# Patient Record
Sex: MALE | Race: WHITE | NOT HISPANIC OR LATINO
[De-identification: names, ages, dates, MRNs, and addresses within clinical notes are randomized per-mention and may not be internally consistent; named-entity substitution may affect disease eponyms.]

---

## 2020-01-02 ENCOUNTER — APPOINTMENT (OUTPATIENT)
Dept: ORTHOPEDIC SURGERY | Facility: CLINIC | Age: 70
End: 2020-01-02
Payer: MEDICARE

## 2020-01-02 VITALS
HEART RATE: 71 BPM | HEIGHT: 70 IN | SYSTOLIC BLOOD PRESSURE: 133 MMHG | WEIGHT: 180 LBS | BODY MASS INDEX: 25.77 KG/M2 | DIASTOLIC BLOOD PRESSURE: 88 MMHG

## 2020-01-02 DIAGNOSIS — Z72.89 OTHER PROBLEMS RELATED TO LIFESTYLE: ICD-10-CM

## 2020-01-02 DIAGNOSIS — S99.921A UNSPECIFIED INJURY OF RIGHT FOOT, INITIAL ENCOUNTER: ICD-10-CM

## 2020-01-02 DIAGNOSIS — M20.21 HALLUX RIGIDUS, RIGHT FOOT: ICD-10-CM

## 2020-01-02 DIAGNOSIS — Z80.0 FAMILY HISTORY OF MALIGNANT NEOPLASM OF DIGESTIVE ORGANS: ICD-10-CM

## 2020-01-02 DIAGNOSIS — Z86.39 PERSONAL HISTORY OF OTHER ENDOCRINE, NUTRITIONAL AND METABOLIC DISEASE: ICD-10-CM

## 2020-01-02 DIAGNOSIS — Z78.9 OTHER SPECIFIED HEALTH STATUS: ICD-10-CM

## 2020-01-02 DIAGNOSIS — M21.611 BUNION OF RIGHT FOOT: ICD-10-CM

## 2020-01-02 PROBLEM — Z00.00 ENCOUNTER FOR PREVENTIVE HEALTH EXAMINATION: Status: ACTIVE | Noted: 2020-01-02

## 2020-01-02 PROCEDURE — 73630 X-RAY EXAM OF FOOT: CPT | Mod: RT

## 2020-01-02 PROCEDURE — 99214 OFFICE O/P EST MOD 30 MIN: CPT

## 2020-01-02 NOTE — PHYSICAL EXAM
[de-identified] : General: Alert and oriented x3. In no acute distress. Pleasant in nature with a normal affect. No apparent respiratory distress.\par \par R Foot Exam\par Skin: Clean, dry, intact\par Inspection: No obvious malalignment, no masses, +swelling, +erythema to MTP joint, no effusion\par Pulses: 2+ DP/PT pulses\par ROM: FOOT Full ROM of digits, ANKLE 10 degrees of dorsiflexion, 40 degrees of plantarflexion, 10 degrees of subtalar motion.\par Painful ROM: None\par Tenderness: +pain at 1st MTP joint. No tenderness over the medial malleolus, No tenderness over the lateral malleolus, no CFL/ATFL/PTFL pain, no deltoid ligament pain. No heel pain. No Achilles tenderness. No 5th metatarsal pain. No pain to the LisFranc joint. No ttp over the posterior tibial tendon.\par Stability: Negative anterior/posterior drawer.\par Strength: 5/5 ADD/ABD/TA/GS/EHL/FHL/EDL\par Neuro: Sensation in tact to light touch throughout\par Additional tests: Negative Mortons test, negative tarsal tunnel tinels, negative single heel rise. \par \par R Big Toe Exam\par ROM Great toe: Sluggish but functional movement to big toe.  [de-identified] : 3V of the right foot were ordered obtained and reviewed by me today, 01/02/2020 , revealed: Hallux rigidus, no acute fracture.

## 2020-01-02 NOTE — CONSULT LETTER
[Please see my note below.] : Please see my note below. [Consult Letter:] : I had the pleasure of evaluating your patient, [unfilled]. [Consult Closing:] : Thank you very much for allowing me to participate in the care of this patient.  If you have any questions, please do not hesitate to contact me. [Sincerely,] : Sincerely, [FreeTextEntry3] : Stephen Crystal, DO\par Foot and Ankle Surgery\par

## 2020-01-02 NOTE — HISTORY OF PRESENT ILLNESS
[FreeTextEntry1] : 69 year old male presenting with right foot pain. The patient’s pain is noted to be a 5/10. The patient's pain began on 12/19/19 when he hit his big toe against the corner of the bed, and it was also stepped on by a dog. The patient describes their pain as intermittent, sharp, and shooting. The pain is made worse by walking. He is currently taking Advil, ibuprofen and utilizing ice. No other complaints at this time.

## 2020-01-02 NOTE — ADDENDUM
[FreeTextEntry1] : I, Isaac Bahena, acted solely as a scribe for Dr. Stephen Crystal on this date 01/02/2020 .\par All medical record entries made by the Scribe were at my, Dr. Stephen Crystal, direction and personally dictated by me on 01/02/2020 . I have reviewed the chart and agree that the record accurately reflects my personal performance of the history, physical exam, assessment and plan. I have also personally directed, reviewed, and agreed with the chart.

## 2020-01-02 NOTE — DISCUSSION/SUMMARY
[de-identified] : Today I had a lengthy discussion with the patient regarding their right foot pain.I have addressed all the patient's concerns surrounding the pathology of their condition. I recommend that the patient utilize a toe spacer. The patient was provided with a toe spacer in the office today. I recommend that the patient utilize ice, NSAIDS PRN, and heat. They can also elevate their right foot above the level of the heart. I recommend that the patient utilize a stiff shoe. The patient was provided with the stiff shoe in the office today. I would like to see the patient back in the office PRN to reassess their condition. The patient understood and verbally agreed to the treatment plan. All of their questions were answered and they were satisfied with the visit. The patient should call the office if they have any questions or experience worsening symptoms.

## 2020-06-10 VITALS — BODY MASS INDEX: 26.83 KG/M2 | WEIGHT: 187 LBS | TEMPERATURE: 97.9 F

## 2022-12-06 ENCOUNTER — APPOINTMENT (OUTPATIENT)
Dept: UROLOGY | Facility: CLINIC | Age: 72
End: 2022-12-06

## 2022-12-06 VITALS
WEIGHT: 182 LBS | SYSTOLIC BLOOD PRESSURE: 147 MMHG | OXYGEN SATURATION: 98 % | HEIGHT: 70 IN | HEART RATE: 82 BPM | DIASTOLIC BLOOD PRESSURE: 75 MMHG | BODY MASS INDEX: 26.05 KG/M2

## 2022-12-06 DIAGNOSIS — R97.20 ELEVATED PROSTATE, SPECIFIC ANTIGEN [PSA]: ICD-10-CM

## 2022-12-06 DIAGNOSIS — R79.89 OTHER SPECIFIED ABNORMAL FINDINGS OF BLOOD CHEMISTRY: ICD-10-CM

## 2022-12-06 PROCEDURE — 99204 OFFICE O/P NEW MOD 45 MIN: CPT

## 2022-12-06 RX ORDER — SULFAMETHOXAZOLE AND TRIMETHOPRIM 800; 160 MG/1; MG/1
800-160 TABLET ORAL TWICE DAILY
Qty: 20 | Refills: 0 | Status: ACTIVE | COMMUNITY
Start: 2022-12-06 | End: 1900-01-01

## 2022-12-06 NOTE — PHYSICAL EXAM
[General Appearance - Well Developed] : well developed [General Appearance - Well Nourished] : well nourished [Normal Appearance] : normal appearance [Well Groomed] : well groomed [General Appearance - In No Acute Distress] : no acute distress [Edema] : no peripheral edema [Respiration, Rhythm And Depth] : normal respiratory rhythm and effort [Exaggerated Use Of Accessory Muscles For Inspiration] : no accessory muscle use [Abdomen Soft] : soft [Abdomen Tenderness] : non-tender [Costovertebral Angle Tenderness] : no ~M costovertebral angle tenderness [Urethral Meatus] : meatus normal [Urinary Bladder Findings] : the bladder was normal on palpation [Scrotum] : the scrotum was normal [Testes Mass (___cm)] : there were no testicular masses [No Prostate Nodules] : no prostate nodules [Normal Station and Gait] : the gait and station were normal for the patient's age [] : no rash [No Focal Deficits] : no focal deficits [Oriented To Time, Place, And Person] : oriented to person, place, and time [Affect] : the affect was normal [Mood] : the mood was normal [Not Anxious] : not anxious [No Palpable Adenopathy] : no palpable adenopathy [FreeTextEntry1] : Normal male external genitalia. The prostate gland is small to moderate size and palpably benign.

## 2022-12-06 NOTE — REVIEW OF SYSTEMS
[Wake up at night to urinate  How many times?  ___] : wakes up to urinate [unfilled] times during the night [see HPI] : see HPI [Negative] : Cardiovascular [FreeTextEntry2] : High blood pressure

## 2022-12-06 NOTE — HISTORY OF PRESENT ILLNESS
[FreeTextEntry1] : 72M presents today with a CC of a PSA elevation to 4.2 and a creatinine elevation to 1.59. Pt feels that he is urinating well and only has minor  symptoms of note. He has no history of  or renal problems in the past. PMHx includes HTN, high cholesterol, hypothyroidism, and food retention. PSHx includes knee replacement and operation for tongue cancer. Medications include Levothyroxine 75mcg daily, Atenolol 22.5mg BID, Amlodipine 10mg daily, Pravastatin 10mg daily, HCTZ 12.5mg daily. Tobacco use is quit several years ago. ETOH use is very occasional. Pt worked as a  and is now retired.

## 2022-12-06 NOTE — END OF VISIT
[FreeTextEntry3] : He will take a course of Bactrim DS. He will repeat the PSA and a renal panel in 10 days. I ordered a US of the bladder and kidneys. He will follow up with a TRUSP with plans to follow.

## 2022-12-27 ENCOUNTER — APPOINTMENT (OUTPATIENT)
Dept: UROLOGY | Facility: CLINIC | Age: 72
End: 2022-12-27

## 2022-12-27 VITALS
SYSTOLIC BLOOD PRESSURE: 186 MMHG | DIASTOLIC BLOOD PRESSURE: 78 MMHG | WEIGHT: 180 LBS | HEIGHT: 70 IN | OXYGEN SATURATION: 96 % | HEART RATE: 63 BPM | BODY MASS INDEX: 25.77 KG/M2

## 2022-12-27 DIAGNOSIS — R33.9 RETENTION OF URINE, UNSPECIFIED: ICD-10-CM

## 2022-12-27 PROCEDURE — 99213 OFFICE O/P EST LOW 20 MIN: CPT | Mod: 25

## 2022-12-27 PROCEDURE — 76872 US TRANSRECTAL: CPT

## 2022-12-27 PROCEDURE — 51741 ELECTRO-UROFLOWMETRY FIRST: CPT

## 2022-12-27 PROCEDURE — 76857 US EXAM PELVIC LIMITED: CPT

## 2022-12-27 NOTE — END OF VISIT
[FreeTextEntry3] : He will start Tamsulosin and Bethanechol with a follow up urodynamic study in 1 month.

## 2022-12-27 NOTE — REVIEW OF SYSTEMS
[see HPI] : see HPI [Wake up at night to urinate  How many times?  ___] : wakes up to urinate [unfilled] times during the night [Negative] : Heme/Lymph [FreeTextEntry2] : High blood pressure

## 2022-12-27 NOTE — HISTORY OF PRESENT ILLNESS
[FreeTextEntry1] : 72M presents today with a CC of a mild creatinine elevation and a high urinary residual on a TRUSP. Pt has a creatinine of 1.39. His creatineine went down to 3.2 from 4.2 after a course of antibiotics.

## 2022-12-27 NOTE — PHYSICAL EXAM
[General Appearance - Well Developed] : well developed [General Appearance - Well Nourished] : well nourished [Normal Appearance] : normal appearance [Well Groomed] : well groomed [General Appearance - In No Acute Distress] : no acute distress [Abdomen Soft] : soft [Abdomen Tenderness] : non-tender [Costovertebral Angle Tenderness] : no ~M costovertebral angle tenderness [Urethral Meatus] : meatus normal [Urinary Bladder Findings] : the bladder was normal on palpation [Scrotum] : the scrotum was normal [Testes Mass (___cm)] : there were no testicular masses [No Prostate Nodules] : no prostate nodules [Edema] : no peripheral edema [] : no respiratory distress [Respiration, Rhythm And Depth] : normal respiratory rhythm and effort [Exaggerated Use Of Accessory Muscles For Inspiration] : no accessory muscle use [Oriented To Time, Place, And Person] : oriented to person, place, and time [Affect] : the affect was normal [Mood] : the mood was normal [Not Anxious] : not anxious [Normal Station and Gait] : the gait and station were normal for the patient's age [No Focal Deficits] : no focal deficits [No Palpable Adenopathy] : no palpable adenopathy [FreeTextEntry1] : Normal male external genitalia. The prostate gland is small to moderate size and palpably benign.

## 2022-12-28 RX ORDER — BETHANECHOL CHLORIDE 50 MG/1
50 TABLET ORAL
Qty: 60 | Refills: 2 | Status: ACTIVE | COMMUNITY
Start: 2022-12-28 | End: 1900-01-01

## 2022-12-28 RX ORDER — TAMSULOSIN HYDROCHLORIDE 0.4 MG/1
0.4 CAPSULE ORAL
Qty: 90 | Refills: 2 | Status: ACTIVE | COMMUNITY
Start: 2022-12-28 | End: 1900-01-01

## 2023-01-05 ENCOUNTER — APPOINTMENT (OUTPATIENT)
Dept: UROLOGY | Facility: CLINIC | Age: 73
End: 2023-01-05

## 2023-01-19 ENCOUNTER — APPOINTMENT (OUTPATIENT)
Dept: UROLOGY | Facility: CLINIC | Age: 73
End: 2023-01-19
Payer: MEDICARE

## 2023-01-19 VITALS
DIASTOLIC BLOOD PRESSURE: 62 MMHG | OXYGEN SATURATION: 95 % | HEIGHT: 70 IN | WEIGHT: 180 LBS | HEART RATE: 64 BPM | SYSTOLIC BLOOD PRESSURE: 129 MMHG | TEMPERATURE: 98 F | BODY MASS INDEX: 25.77 KG/M2 | RESPIRATION RATE: 14 BRPM

## 2023-01-19 DIAGNOSIS — N40.1 BENIGN PROSTATIC HYPERPLASIA WITH LOWER URINARY TRACT SYMPMS: ICD-10-CM

## 2023-01-19 DIAGNOSIS — N13.8 BENIGN PROSTATIC HYPERPLASIA WITH LOWER URINARY TRACT SYMPMS: ICD-10-CM

## 2023-01-19 PROCEDURE — 51798 US URINE CAPACITY MEASURE: CPT

## 2023-01-19 PROCEDURE — 51741 ELECTRO-UROFLOWMETRY FIRST: CPT

## 2023-01-19 PROCEDURE — 51797 INTRAABDOMINAL PRESSURE TEST: CPT

## 2023-01-19 PROCEDURE — 51784 ANAL/URINARY MUSCLE STUDY: CPT

## 2023-01-19 PROCEDURE — 51728 CYSTOMETROGRAM W/VP: CPT

## 2023-01-23 ENCOUNTER — APPOINTMENT (OUTPATIENT)
Dept: VASCULAR SURGERY | Facility: CLINIC | Age: 73
End: 2023-01-23
Payer: MEDICARE

## 2023-01-23 VITALS
BODY MASS INDEX: 25.77 KG/M2 | HEIGHT: 70 IN | SYSTOLIC BLOOD PRESSURE: 112 MMHG | HEART RATE: 58 BPM | DIASTOLIC BLOOD PRESSURE: 64 MMHG | WEIGHT: 180 LBS

## 2023-01-23 PROCEDURE — 93880 EXTRACRANIAL BILAT STUDY: CPT

## 2023-01-23 PROCEDURE — 99212 OFFICE O/P EST SF 10 MIN: CPT

## 2023-01-23 NOTE — ASSESSMENT
[FreeTextEntry1] : 72-year-old with asymptomatic moderate carotid artery disease bilaterally.  I reviewed the results of the carotid duplex study with the patient and suggested continued surveillance approximately 1 year.\par \par All questions were answered.

## 2023-01-23 NOTE — PHYSICAL EXAM
[Alert] : alert [Oriented to Person] : oriented to person [Oriented to Place] : oriented to place [Oriented to Time] : oriented to time

## 2023-01-23 NOTE — HISTORY OF PRESENT ILLNESS
[FreeTextEntry1] : Patient presents for routine reevaluation.  He had undergone a carotid duplex study in this office on January 23 (today) that demonstrated essentially stable carotid disease bilaterally.  His right side had flow velocities in the 50 to 69% narrowing range and is left in the less than 50% range.\par \par He denies neurologic event since his last office visit.  He remains on 1 baby aspirin daily.

## 2023-01-26 ENCOUNTER — APPOINTMENT (OUTPATIENT)
Dept: UROLOGY | Facility: CLINIC | Age: 73
End: 2023-01-26

## 2023-01-27 PROBLEM — N40.1 BPH WITH URINARY OBSTRUCTION: Status: ACTIVE | Noted: 2022-12-27

## 2023-05-10 ENCOUNTER — NON-APPOINTMENT (OUTPATIENT)
Age: 73
End: 2023-05-10

## 2023-05-10 DIAGNOSIS — E78.5 HYPERLIPIDEMIA, UNSPECIFIED: ICD-10-CM

## 2023-05-10 DIAGNOSIS — Z87.891 PERSONAL HISTORY OF NICOTINE DEPENDENCE: ICD-10-CM

## 2023-05-10 DIAGNOSIS — E03.9 HYPOTHYROIDISM, UNSPECIFIED: ICD-10-CM

## 2023-05-10 RX ORDER — AMLODIPINE BESYLATE 5 MG/1
5 TABLET ORAL DAILY
Refills: 0 | Status: ACTIVE | COMMUNITY

## 2023-05-10 RX ORDER — ATENOLOL 25 MG/1
25 TABLET ORAL TWICE DAILY
Refills: 0 | Status: ACTIVE | COMMUNITY

## 2023-05-10 RX ORDER — LEVOTHYROXINE SODIUM 0.07 MG/1
75 TABLET ORAL DAILY
Refills: 0 | Status: ACTIVE | COMMUNITY

## 2023-05-10 RX ORDER — HYDRALAZINE HYDROCHLORIDE 50 MG/1
50 TABLET ORAL DAILY
Refills: 0 | Status: ACTIVE | COMMUNITY

## 2023-05-10 RX ORDER — PRAVASTATIN SODIUM 10 MG/1
10 TABLET ORAL DAILY
Refills: 0 | Status: ACTIVE | COMMUNITY

## 2023-08-24 ENCOUNTER — NON-APPOINTMENT (OUTPATIENT)
Age: 73
End: 2023-08-24

## 2023-10-23 ENCOUNTER — APPOINTMENT (OUTPATIENT)
Dept: VASCULAR SURGERY | Facility: CLINIC | Age: 73
End: 2023-10-23

## 2023-10-23 ENCOUNTER — APPOINTMENT (OUTPATIENT)
Dept: VASCULAR SURGERY | Facility: CLINIC | Age: 73
End: 2023-10-23
Payer: MEDICARE

## 2023-10-23 VITALS — OXYGEN SATURATION: 95 % | HEIGHT: 70 IN | WEIGHT: 178 LBS | HEART RATE: 60 BPM | BODY MASS INDEX: 25.48 KG/M2

## 2023-10-23 DIAGNOSIS — I77.9 DISORDER OF ARTERIES AND ARTERIOLES, UNSPECIFIED: ICD-10-CM

## 2023-10-23 DIAGNOSIS — I77.71 DISSECTION OF CAROTID ARTERY: ICD-10-CM

## 2023-10-23 DIAGNOSIS — I10 ESSENTIAL (PRIMARY) HYPERTENSION: ICD-10-CM

## 2023-10-23 PROCEDURE — 99214 OFFICE O/P EST MOD 30 MIN: CPT

## 2023-10-23 PROCEDURE — 93880 EXTRACRANIAL BILAT STUDY: CPT

## 2023-10-24 PROBLEM — I77.9 CAROTID ARTERY DISEASE: Status: ACTIVE | Noted: 2023-01-23

## 2023-10-24 PROBLEM — I10 HYPERTENSION: Status: ACTIVE | Noted: 2023-05-10

## 2023-10-26 ENCOUNTER — APPOINTMENT (OUTPATIENT)
Dept: CT IMAGING | Facility: CLINIC | Age: 73
End: 2023-10-26
Payer: MEDICARE

## 2023-10-26 ENCOUNTER — OUTPATIENT (OUTPATIENT)
Dept: OUTPATIENT SERVICES | Facility: HOSPITAL | Age: 73
LOS: 1 days | End: 2023-10-26
Payer: MEDICARE

## 2023-10-26 DIAGNOSIS — I77.71 DISSECTION OF CAROTID ARTERY: ICD-10-CM

## 2023-10-26 PROCEDURE — 70496 CT ANGIOGRAPHY HEAD: CPT

## 2023-10-26 PROCEDURE — 70496 CT ANGIOGRAPHY HEAD: CPT | Mod: 26,MH

## 2023-10-26 PROCEDURE — 70498 CT ANGIOGRAPHY NECK: CPT

## 2023-10-26 PROCEDURE — 70498 CT ANGIOGRAPHY NECK: CPT | Mod: 26,MH

## 2024-05-01 ENCOUNTER — APPOINTMENT (OUTPATIENT)
Dept: VASCULAR SURGERY | Facility: CLINIC | Age: 74
End: 2024-05-01

## 2024-05-01 ENCOUNTER — APPOINTMENT (OUTPATIENT)
Dept: VASCULAR SURGERY | Facility: CLINIC | Age: 74
End: 2024-05-01
Payer: MEDICARE

## 2024-05-01 PROCEDURE — 99213 OFFICE O/P EST LOW 20 MIN: CPT

## 2024-05-01 PROCEDURE — 93880 EXTRACRANIAL BILAT STUDY: CPT

## 2024-05-01 RX ORDER — LORAZEPAM 2 MG/1
TABLET ORAL
Refills: 0 | Status: ACTIVE | COMMUNITY

## 2024-05-30 ENCOUNTER — APPOINTMENT (OUTPATIENT)
Dept: GASTROENTEROLOGY | Facility: CLINIC | Age: 74
End: 2024-05-30
Payer: MEDICARE

## 2024-05-30 VITALS
BODY MASS INDEX: 24.62 KG/M2 | WEIGHT: 172 LBS | HEIGHT: 70 IN | DIASTOLIC BLOOD PRESSURE: 98 MMHG | SYSTOLIC BLOOD PRESSURE: 162 MMHG

## 2024-05-30 DIAGNOSIS — K21.9 GASTRO-ESOPHAGEAL REFLUX DISEASE W/OUT ESOPHAGITIS: ICD-10-CM

## 2024-05-30 DIAGNOSIS — R13.19 OTHER DYSPHAGIA: ICD-10-CM

## 2024-05-30 DIAGNOSIS — Z85.810 PERSONAL HISTORY OF MALIGNANT NEOPLASM OF TONGUE: ICD-10-CM

## 2024-05-30 PROCEDURE — 99205 OFFICE O/P NEW HI 60 MIN: CPT

## 2024-05-30 NOTE — ASSESSMENT
[FreeTextEntry1] : 73yo male with dysphagia, gerd  he will need egd to evaluate dysphagia have concerns about ability to tolerate anesthesia and potential ease of intubation  I spoke with dr hartman, anesthesia, dr resendez referring doctor  we oliver review ent records and he may need to see to comment on airway and intubation will check egd

## 2024-05-30 NOTE — HISTORY OF PRESENT ILLNESS
[FreeTextEntry1] : 73yo male with dysphagia  He has hx tongue cancer s/p xrt, chemo about 20 years ago  he has residual dysphonia Also with increasing dysphagia  He has hx aspiration after knee surgery He had recent negative ENT evaluation but more for sinus issues  He had recent colonoscopy and endoscopy canceled after anesthesia concerned

## 2024-07-10 ENCOUNTER — APPOINTMENT (OUTPATIENT)
Dept: ORTHOPEDIC SURGERY | Facility: CLINIC | Age: 74
End: 2024-07-10

## 2024-08-19 ENCOUNTER — APPOINTMENT (OUTPATIENT)
Dept: VASCULAR SURGERY | Facility: CLINIC | Age: 74
End: 2024-08-19

## 2024-08-19 VITALS
BODY MASS INDEX: 21.76 KG/M2 | SYSTOLIC BLOOD PRESSURE: 168 MMHG | HEART RATE: 84 BPM | OXYGEN SATURATION: 96 % | WEIGHT: 152 LBS | HEIGHT: 70 IN | DIASTOLIC BLOOD PRESSURE: 73 MMHG

## 2024-08-19 DIAGNOSIS — Z86.73 PERSONAL HISTORY OF TRANSIENT ISCHEMIC ATTACK (TIA), AND CEREBRAL INFARCTION W/OUT RESIDUAL DEFICITS: ICD-10-CM

## 2024-08-19 PROCEDURE — 99214 OFFICE O/P EST MOD 30 MIN: CPT

## 2024-08-19 PROCEDURE — 93880 EXTRACRANIAL BILAT STUDY: CPT

## 2024-09-23 ENCOUNTER — APPOINTMENT (OUTPATIENT)
Dept: VASCULAR SURGERY | Facility: CLINIC | Age: 74
End: 2024-09-23

## 2024-11-25 ENCOUNTER — APPOINTMENT (OUTPATIENT)
Dept: VASCULAR SURGERY | Facility: CLINIC | Age: 74
End: 2024-11-25
Payer: MEDICARE

## 2024-11-25 VITALS
SYSTOLIC BLOOD PRESSURE: 178 MMHG | BODY MASS INDEX: 21.76 KG/M2 | HEART RATE: 70 BPM | HEIGHT: 70 IN | WEIGHT: 152 LBS | OXYGEN SATURATION: 98 % | DIASTOLIC BLOOD PRESSURE: 80 MMHG

## 2024-11-25 DIAGNOSIS — I77.9 DISORDER OF ARTERIES AND ARTERIOLES, UNSPECIFIED: ICD-10-CM

## 2024-11-25 DIAGNOSIS — I77.71 DISSECTION OF CAROTID ARTERY: ICD-10-CM

## 2024-11-25 PROCEDURE — 99213 OFFICE O/P EST LOW 20 MIN: CPT

## 2024-11-25 PROCEDURE — 93880 EXTRACRANIAL BILAT STUDY: CPT

## 2024-12-15 PROBLEM — I63.521 CEREBROVASCULAR ACCIDENT (CVA) DUE TO OCCLUSION OF RIGHT ANTERIOR CEREBRAL ARTERY: Status: ACTIVE | Noted: 2024-12-15

## 2024-12-18 ENCOUNTER — APPOINTMENT (OUTPATIENT)
Dept: VASCULAR SURGERY | Facility: CLINIC | Age: 74
End: 2024-12-18
Payer: MEDICARE

## 2024-12-18 VITALS
OXYGEN SATURATION: 96 % | DIASTOLIC BLOOD PRESSURE: 98 MMHG | SYSTOLIC BLOOD PRESSURE: 177 MMHG | BODY MASS INDEX: 22.33 KG/M2 | WEIGHT: 156 LBS | HEIGHT: 70 IN | HEART RATE: 79 BPM

## 2024-12-18 DIAGNOSIS — I63.521 CEREBRAL INFARCTION DUE TO UNSPECIFIED OCCLUSION OR STENOSIS OF RIGHT ANTERIOR CEREBRAL ARTERY: ICD-10-CM

## 2024-12-18 DIAGNOSIS — I77.9 DISORDER OF ARTERIES AND ARTERIOLES, UNSPECIFIED: ICD-10-CM

## 2024-12-18 DIAGNOSIS — I77.71 DISSECTION OF CAROTID ARTERY: ICD-10-CM

## 2024-12-18 PROCEDURE — 99213 OFFICE O/P EST LOW 20 MIN: CPT

## 2024-12-24 ENCOUNTER — APPOINTMENT (OUTPATIENT)
Dept: NEUROLOGY | Facility: CLINIC | Age: 74
End: 2024-12-24

## 2024-12-25 PROBLEM — F10.90 ALCOHOL USE: Status: ACTIVE | Noted: 2020-01-02

## 2025-04-28 ENCOUNTER — INPATIENT (INPATIENT)
Facility: HOSPITAL | Age: 75
LOS: 2 days | Discharge: HOME | DRG: 378 | End: 2025-05-01
Attending: STUDENT IN AN ORGANIZED HEALTH CARE EDUCATION/TRAINING PROGRAM | Admitting: STUDENT IN AN ORGANIZED HEALTH CARE EDUCATION/TRAINING PROGRAM
Payer: MEDICARE

## 2025-04-28 VITALS
OXYGEN SATURATION: 100 % | HEART RATE: 87 BPM | WEIGHT: 160.94 LBS | RESPIRATION RATE: 18 BRPM | SYSTOLIC BLOOD PRESSURE: 139 MMHG | TEMPERATURE: 98 F | DIASTOLIC BLOOD PRESSURE: 68 MMHG

## 2025-04-28 DIAGNOSIS — K92.2 GASTROINTESTINAL HEMORRHAGE, UNSPECIFIED: ICD-10-CM

## 2025-04-28 LAB
ALBUMIN SERPL ELPH-MCNC: 3.5 G/DL — SIGNIFICANT CHANGE UP (ref 3.3–5)
ALP SERPL-CCNC: 94 U/L — SIGNIFICANT CHANGE UP (ref 40–120)
ALT FLD-CCNC: 19 U/L — SIGNIFICANT CHANGE UP (ref 12–78)
ANION GAP SERPL CALC-SCNC: 6 MMOL/L — SIGNIFICANT CHANGE UP (ref 5–17)
APTT BLD: 31.7 SEC — SIGNIFICANT CHANGE UP (ref 26.1–36.8)
AST SERPL-CCNC: 18 U/L — SIGNIFICANT CHANGE UP (ref 15–37)
BASOPHILS # BLD AUTO: 0.08 K/UL — SIGNIFICANT CHANGE UP (ref 0–0.2)
BASOPHILS NFR BLD AUTO: 1.3 % — SIGNIFICANT CHANGE UP (ref 0–2)
BILIRUB SERPL-MCNC: 0.7 MG/DL — SIGNIFICANT CHANGE UP (ref 0.2–1.2)
BLD GP AB SCN SERPL QL: SIGNIFICANT CHANGE UP
BUN SERPL-MCNC: 29 MG/DL — HIGH (ref 7–23)
CALCIUM SERPL-MCNC: 9.4 MG/DL — SIGNIFICANT CHANGE UP (ref 8.5–10.1)
CHLORIDE SERPL-SCNC: 105 MMOL/L — SIGNIFICANT CHANGE UP (ref 96–108)
CO2 SERPL-SCNC: 28 MMOL/L — SIGNIFICANT CHANGE UP (ref 22–31)
CREAT SERPL-MCNC: 1.55 MG/DL — HIGH (ref 0.5–1.3)
EGFR: 46 ML/MIN/1.73M2 — LOW
EGFR: 46 ML/MIN/1.73M2 — LOW
EOSINOPHIL # BLD AUTO: 0.23 K/UL — SIGNIFICANT CHANGE UP (ref 0–0.5)
EOSINOPHIL NFR BLD AUTO: 3.7 % — SIGNIFICANT CHANGE UP (ref 0–6)
GLUCOSE SERPL-MCNC: 106 MG/DL — HIGH (ref 70–99)
HCT VFR BLD CALC: 32.3 % — LOW (ref 39–50)
HGB BLD-MCNC: 11 G/DL — LOW (ref 13–17)
IMM GRANULOCYTES # BLD AUTO: 0.01 K/UL — SIGNIFICANT CHANGE UP (ref 0–0.07)
IMM GRANULOCYTES NFR BLD AUTO: 0.2 % — SIGNIFICANT CHANGE UP (ref 0–0.9)
INR BLD: 0.97 RATIO — SIGNIFICANT CHANGE UP (ref 0.85–1.16)
LYMPHOCYTES # BLD AUTO: 1.07 K/UL — SIGNIFICANT CHANGE UP (ref 1–3.3)
LYMPHOCYTES NFR BLD AUTO: 17.3 % — SIGNIFICANT CHANGE UP (ref 13–44)
MCHC RBC-ENTMCNC: 30.7 PG — SIGNIFICANT CHANGE UP (ref 27–34)
MCHC RBC-ENTMCNC: 34.1 G/DL — SIGNIFICANT CHANGE UP (ref 32–36)
MCV RBC AUTO: 90.2 FL — SIGNIFICANT CHANGE UP (ref 80–100)
MONOCYTES # BLD AUTO: 0.36 K/UL — SIGNIFICANT CHANGE UP (ref 0–0.9)
MONOCYTES NFR BLD AUTO: 5.8 % — SIGNIFICANT CHANGE UP (ref 2–14)
NEUTROPHILS # BLD AUTO: 4.43 K/UL — SIGNIFICANT CHANGE UP (ref 1.8–7.4)
NEUTROPHILS NFR BLD AUTO: 71.7 % — SIGNIFICANT CHANGE UP (ref 43–77)
NRBC # BLD AUTO: 0 K/UL — SIGNIFICANT CHANGE UP (ref 0–0)
NRBC # FLD: 0 K/UL — SIGNIFICANT CHANGE UP (ref 0–0)
NRBC BLD AUTO-RTO: 0 /100 WBCS — SIGNIFICANT CHANGE UP (ref 0–0)
PLATELET # BLD AUTO: 212 K/UL — SIGNIFICANT CHANGE UP (ref 150–400)
PMV BLD: 10.9 FL — SIGNIFICANT CHANGE UP (ref 7–13)
POTASSIUM SERPL-MCNC: 4.4 MMOL/L — SIGNIFICANT CHANGE UP (ref 3.5–5.3)
POTASSIUM SERPL-SCNC: 4.4 MMOL/L — SIGNIFICANT CHANGE UP (ref 3.5–5.3)
PROT SERPL-MCNC: 6.9 GM/DL — SIGNIFICANT CHANGE UP (ref 6–8.3)
PROTHROM AB SERPL-ACNC: 11.2 SEC — SIGNIFICANT CHANGE UP (ref 9.9–13.4)
RBC # BLD: 3.58 M/UL — LOW (ref 4.2–5.8)
RBC # FLD: 12.7 % — SIGNIFICANT CHANGE UP (ref 10.3–14.5)
SODIUM SERPL-SCNC: 139 MMOL/L — SIGNIFICANT CHANGE UP (ref 135–145)
WBC # BLD: 6.18 K/UL — SIGNIFICANT CHANGE UP (ref 3.8–10.5)
WBC # FLD AUTO: 6.18 K/UL — SIGNIFICANT CHANGE UP (ref 3.8–10.5)

## 2025-04-28 PROCEDURE — 80048 BASIC METABOLIC PNL TOTAL CA: CPT

## 2025-04-28 PROCEDURE — 93010 ELECTROCARDIOGRAM REPORT: CPT

## 2025-04-28 PROCEDURE — 83550 IRON BINDING TEST: CPT

## 2025-04-28 PROCEDURE — 85027 COMPLETE CBC AUTOMATED: CPT

## 2025-04-28 PROCEDURE — 85014 HEMATOCRIT: CPT

## 2025-04-28 PROCEDURE — 82728 ASSAY OF FERRITIN: CPT

## 2025-04-28 PROCEDURE — 99285 EMERGENCY DEPT VISIT HI MDM: CPT

## 2025-04-28 PROCEDURE — 74178 CT ABD&PLV WO CNTR FLWD CNTR: CPT | Mod: 26

## 2025-04-28 PROCEDURE — 85018 HEMOGLOBIN: CPT

## 2025-04-28 PROCEDURE — 36415 COLL VENOUS BLD VENIPUNCTURE: CPT

## 2025-04-28 PROCEDURE — 83540 ASSAY OF IRON: CPT

## 2025-04-28 RX ORDER — LEVOTHYROXINE SODIUM 300 MCG
1 TABLET ORAL
Refills: 0 | DISCHARGE

## 2025-04-28 RX ORDER — CLOPIDOGREL BISULFATE 75 MG/1
1 TABLET, FILM COATED ORAL
Refills: 0 | DISCHARGE

## 2025-04-28 RX ORDER — ASPIRIN 325 MG
1 TABLET ORAL
Refills: 0 | DISCHARGE

## 2025-04-28 RX ORDER — AMLODIPINE BESYLATE 10 MG/1
1 TABLET ORAL
Refills: 0 | DISCHARGE

## 2025-04-28 RX ORDER — ATORVASTATIN CALCIUM 80 MG/1
1 TABLET, FILM COATED ORAL
Refills: 0 | DISCHARGE

## 2025-04-28 NOTE — ED PROVIDER NOTE - NSICDXPASTMEDICALHX_GEN_ALL_CORE_FT
PAST MEDICAL HISTORY:  CVA (cerebrovascular accident)     HLD (hyperlipidemia)     HTN (hypertension)     Tongue cancer

## 2025-04-28 NOTE — ED PROVIDER NOTE - OBJECTIVE STATEMENT
74 y/o male PMHX HTN, HLD, CVA on asprin and Plavix, tongue cancer in remission, sent to ED by colorectal Dr. Dewitt for bloody stool for past 3 days. Pt went to see his colorectal surgeon Dr. Dewitt who did rectal and found occult blood and sent to ED for admission. Pt denies abdominal pain. No history of abdominal surgeries or gastrointestinal bleeding. Pt's last colonoscopy was 5 years ago and was negative, per patient.

## 2025-04-28 NOTE — ED ADULT NURSE REASSESSMENT NOTE - NS ED NURSE REASSESS COMMENT FT1
Pt aox3, denies weakness or fatigue.  Resp even, unlabored. Abd soft, NT/ND. Pt reports no bloody BM since arrival. Ambulates OOB to BR with minimal assist. Pt tolerating po fluids, apple sauce. VSS. Repeat H/H @ 0000. Report given to 1N. Pt in no acute distress at this time.

## 2025-04-28 NOTE — ED STATDOCS - PROGRESS NOTE DETAILS
Corey Lyons  76 y/o M with PMHx of HTN, HLD, tongue CA, stroke on Plavix and baby ASA presents to the ED c/o rectal bleeding x4 days. States he has been having BRBPR after going out to eat Friday night. States the bleeding has not stopped. Pt was dizzy after passing a bm this morning but that has since subsided. Reports seeing his Colorectal Surgeon Dr Dewitt, had a rectal exam (which only showed ada blood) and was told to come to the ED for evaluation. Last colonoscopy was last year and was normal. Will send to main.

## 2025-04-28 NOTE — PHARMACOTHERAPY INTERVENTION NOTE - COMMENTS
Medication reconciliation completed.  Reviewed Medication list and confirmed med allergies with patient; confirmed with Dr. First Medjulio c.

## 2025-04-28 NOTE — ED PROVIDER NOTE - PHYSICAL EXAMINATION
General:     NAD, well-nourished, well-appearing  Head:     NC/AT, EOMI, oral mucosa moist  Neck:     trachea midline  Lungs:     CTA b/l, no w/r/r  CVS:     S1S2, RRR, no m/g/r  Abd:     +BS, s/nt/nd, no organomegaly  Rectal:  rectal deferred given positive finding today  Ext:    2+ radial and pedal pulses, no c/c/e  Neuro: AAOx3, no sensory/motor deficits

## 2025-04-28 NOTE — ED ADULT TRIAGE NOTE - CHIEF COMPLAINT QUOTE
pt ambulatory to triage c/o bright red bloody stools since friday. -allergies. pmh HTN, HLD, (on plavix and baby asa), tongue CA, stroke

## 2025-04-28 NOTE — ED ADULT NURSE NOTE - NSFALLHARMRISKINTERV_ED_ALL_ED

## 2025-04-28 NOTE — ED PROVIDER NOTE - CLINICAL SUMMARY MEDICAL DECISION MAKING FREE TEXT BOX
DDx: GI bleed, rule out anemia  plan: CBC, CMP.  Type and screen, CT with/without contrast. GI consult, likely admission.

## 2025-04-28 NOTE — ED ADULT NURSE NOTE - OBJECTIVE STATEMENT
Detail Level: Detailed Was A Bandage Applied: Yes Punch Size In Mm: 3 Biopsy Type: H and E Anesthesia Type: 1% lidocaine with epinephrine Anesthesia Volume In Cc (Will Not Render If 0): 0.5 Additional Anesthesia Volume In Cc (Will Not Render If 0): 0 Hemostasis: Sondra's Epidermal Sutures: 4-0 Nylon Number Of Epidermal Sutures (Optional): 1 Wound Care: No ointment Dressing: bandage Patient Will Remove Sutures At Home?: No Lab: [id number] Lab Facility: [id number] Consent: Written consent was obtained and risks were reviewed including but not limited to scarring, infection, bleeding, scabbing, incomplete removal, nerve damage and allergy to anesthesia. Post-Care Instructions: I reviewed with the patient in detail post-care instructions. Patient is to keep the biopsy site dry overnight, and then apply bacitracin twice daily until healed. Patient may apply hydrogen peroxide soaks to remove any crusting. Home Suture Removal Text: Patient was provided a home suture removal kit and will remove their sutures at home.  If they have any questions or difficulties they will call the office. Notification Instructions: Patient will be notified of biopsy results. However, patient instructed to call the office if not contacted within 2 weeks. Additional Anticipated Plans: If malignant consider curettage and destruction Billing Type: Third-Party Bill Information: Selecting Yes will display possible errors in your note based on the variables you have selected. This validation is only offered as a suggestion for you. PLEASE NOTE THAT THE VALIDATION TEXT WILL BE REMOVED WHEN YOU FINALIZE YOUR NOTE. IF YOU WANT TO FAX A PRELIMINARY NOTE YOU WILL NEED TO TOGGLE THIS TO 'NO' IF YOU DO NOT WANT IT IN YOUR FAXED NOTE. 75y male presented to the ED with complaints of rectal bleeding since Friday. Pt states he has had 4 episodes of bright red mixed stool for the past 4 days. Pt on ASA and Plavix. Pt had stroke last year with right sided weakness.

## 2025-04-29 DIAGNOSIS — Z96.653 PRESENCE OF ARTIFICIAL KNEE JOINT, BILATERAL: Chronic | ICD-10-CM

## 2025-04-29 DIAGNOSIS — S42.009A FRACTURE OF UNSPECIFIED PART OF UNSPECIFIED CLAVICLE, INITIAL ENCOUNTER FOR CLOSED FRACTURE: Chronic | ICD-10-CM

## 2025-04-29 DIAGNOSIS — Z98.890 OTHER SPECIFIED POSTPROCEDURAL STATES: Chronic | ICD-10-CM

## 2025-04-29 LAB
ANION GAP SERPL CALC-SCNC: 2 MMOL/L — LOW (ref 5–17)
BUN SERPL-MCNC: 22 MG/DL — SIGNIFICANT CHANGE UP (ref 7–23)
CALCIUM SERPL-MCNC: 9.2 MG/DL — SIGNIFICANT CHANGE UP (ref 8.5–10.1)
CHLORIDE SERPL-SCNC: 107 MMOL/L — SIGNIFICANT CHANGE UP (ref 96–108)
CO2 SERPL-SCNC: 31 MMOL/L — SIGNIFICANT CHANGE UP (ref 22–31)
CREAT SERPL-MCNC: 1.2 MG/DL — SIGNIFICANT CHANGE UP (ref 0.5–1.3)
EGFR: 62 ML/MIN/1.73M2 — SIGNIFICANT CHANGE UP
EGFR: 62 ML/MIN/1.73M2 — SIGNIFICANT CHANGE UP
GLUCOSE SERPL-MCNC: 87 MG/DL — SIGNIFICANT CHANGE UP (ref 70–99)
HCT VFR BLD CALC: 26.3 % — LOW (ref 39–50)
HCT VFR BLD CALC: 26.9 % — LOW (ref 39–50)
HCT VFR BLD CALC: 26.9 % — LOW (ref 39–50)
HGB BLD-MCNC: 8.7 G/DL — LOW (ref 13–17)
HGB BLD-MCNC: 9 G/DL — LOW (ref 13–17)
HGB BLD-MCNC: 9.1 G/DL — LOW (ref 13–17)
MCHC RBC-ENTMCNC: 30.4 PG — SIGNIFICANT CHANGE UP (ref 27–34)
MCHC RBC-ENTMCNC: 30.7 PG — SIGNIFICANT CHANGE UP (ref 27–34)
MCHC RBC-ENTMCNC: 33.5 G/DL — SIGNIFICANT CHANGE UP (ref 32–36)
MCHC RBC-ENTMCNC: 33.8 G/DL — SIGNIFICANT CHANGE UP (ref 32–36)
MCV RBC AUTO: 90.9 FL — SIGNIFICANT CHANGE UP (ref 80–100)
MCV RBC AUTO: 90.9 FL — SIGNIFICANT CHANGE UP (ref 80–100)
NRBC # BLD AUTO: 0 K/UL — SIGNIFICANT CHANGE UP (ref 0–0)
NRBC # BLD AUTO: 0 K/UL — SIGNIFICANT CHANGE UP (ref 0–0)
NRBC # FLD: 0 K/UL — SIGNIFICANT CHANGE UP (ref 0–0)
NRBC # FLD: 0 K/UL — SIGNIFICANT CHANGE UP (ref 0–0)
NRBC BLD AUTO-RTO: 0 /100 WBCS — SIGNIFICANT CHANGE UP (ref 0–0)
NRBC BLD AUTO-RTO: 0 /100 WBCS — SIGNIFICANT CHANGE UP (ref 0–0)
PLATELET # BLD AUTO: 177 K/UL — SIGNIFICANT CHANGE UP (ref 150–400)
PLATELET # BLD AUTO: 192 K/UL — SIGNIFICANT CHANGE UP (ref 150–400)
PMV BLD: 10.7 FL — SIGNIFICANT CHANGE UP (ref 7–13)
PMV BLD: 10.9 FL — SIGNIFICANT CHANGE UP (ref 7–13)
POTASSIUM SERPL-MCNC: 3.8 MMOL/L — SIGNIFICANT CHANGE UP (ref 3.5–5.3)
POTASSIUM SERPL-SCNC: 3.8 MMOL/L — SIGNIFICANT CHANGE UP (ref 3.5–5.3)
RBC # BLD: 2.96 M/UL — LOW (ref 4.2–5.8)
RBC # BLD: 2.96 M/UL — LOW (ref 4.2–5.8)
RBC # FLD: 12.9 % — SIGNIFICANT CHANGE UP (ref 10.3–14.5)
RBC # FLD: 13 % — SIGNIFICANT CHANGE UP (ref 10.3–14.5)
SODIUM SERPL-SCNC: 140 MMOL/L — SIGNIFICANT CHANGE UP (ref 135–145)
WBC # BLD: 5.24 K/UL — SIGNIFICANT CHANGE UP (ref 3.8–10.5)
WBC # BLD: 5.39 K/UL — SIGNIFICANT CHANGE UP (ref 3.8–10.5)
WBC # FLD AUTO: 5.24 K/UL — SIGNIFICANT CHANGE UP (ref 3.8–10.5)
WBC # FLD AUTO: 5.39 K/UL — SIGNIFICANT CHANGE UP (ref 3.8–10.5)

## 2025-04-29 PROCEDURE — 99222 1ST HOSP IP/OBS MODERATE 55: CPT

## 2025-04-29 RX ORDER — ATORVASTATIN CALCIUM 80 MG/1
40 TABLET, FILM COATED ORAL AT BEDTIME
Refills: 0 | Status: DISCONTINUED | OUTPATIENT
Start: 2025-04-29 | End: 2025-05-01

## 2025-04-29 RX ORDER — ACETAMINOPHEN 500 MG/5ML
650 LIQUID (ML) ORAL EVERY 6 HOURS
Refills: 0 | Status: DISCONTINUED | OUTPATIENT
Start: 2025-04-29 | End: 2025-05-01

## 2025-04-29 RX ORDER — CLOPIDOGREL BISULFATE 75 MG/1
75 TABLET, FILM COATED ORAL DAILY
Refills: 0 | Status: DISCONTINUED | OUTPATIENT
Start: 2025-04-29 | End: 2025-05-01

## 2025-04-29 RX ORDER — MAGNESIUM, ALUMINUM HYDROXIDE 200-200 MG
30 TABLET,CHEWABLE ORAL EVERY 4 HOURS
Refills: 0 | Status: DISCONTINUED | OUTPATIENT
Start: 2025-04-29 | End: 2025-05-01

## 2025-04-29 RX ORDER — ONDANSETRON HCL/PF 4 MG/2 ML
4 VIAL (ML) INJECTION EVERY 8 HOURS
Refills: 0 | Status: DISCONTINUED | OUTPATIENT
Start: 2025-04-29 | End: 2025-05-01

## 2025-04-29 RX ORDER — MELATONIN 5 MG
3 TABLET ORAL AT BEDTIME
Refills: 0 | Status: DISCONTINUED | OUTPATIENT
Start: 2025-04-29 | End: 2025-05-01

## 2025-04-29 RX ORDER — ASPIRIN 325 MG
81 TABLET ORAL DAILY
Refills: 0 | Status: DISCONTINUED | OUTPATIENT
Start: 2025-04-29 | End: 2025-05-01

## 2025-04-29 RX ORDER — INFLUENZA A VIRUS A/IDAHO/07/2018 (H1N1) ANTIGEN (MDCK CELL DERIVED, PROPIOLACTONE INACTIVATED, INFLUENZA A VIRUS A/INDIANA/08/2018 (H3N2) ANTIGEN (MDCK CELL DERIVED, PROPIOLACTONE INACTIVATED), INFLUENZA B VIRUS B/SINGAPORE/INFTT-16-0610/2016 ANTIGEN (MDCK CELL DERIVED, PROPIOLACTONE INACTIVATED), INFLUENZA B VIRUS B/IOWA/06/2017 ANTIGEN (MDCK CELL DERIVED, PROPIOLACTONE INACTIVATED) 15; 15; 15; 15 UG/.5ML; UG/.5ML; UG/.5ML; UG/.5ML
0.5 INJECTION, SUSPENSION INTRAMUSCULAR ONCE
Refills: 0 | Status: DISCONTINUED | OUTPATIENT
Start: 2025-04-29 | End: 2025-05-01

## 2025-04-29 RX ORDER — LEVOTHYROXINE SODIUM 300 MCG
75 TABLET ORAL DAILY
Refills: 0 | Status: DISCONTINUED | OUTPATIENT
Start: 2025-04-29 | End: 2025-05-01

## 2025-04-29 RX ORDER — AMLODIPINE BESYLATE 10 MG/1
10 TABLET ORAL DAILY
Refills: 0 | Status: DISCONTINUED | OUTPATIENT
Start: 2025-04-29 | End: 2025-05-01

## 2025-04-29 RX ADMIN — Medication 81 MILLIGRAM(S): at 10:26

## 2025-04-29 RX ADMIN — ATORVASTATIN CALCIUM 40 MILLIGRAM(S): 80 TABLET, FILM COATED ORAL at 20:32

## 2025-04-29 RX ADMIN — Medication 40 MILLIGRAM(S): at 10:24

## 2025-04-29 RX ADMIN — CLOPIDOGREL BISULFATE 75 MILLIGRAM(S): 75 TABLET, FILM COATED ORAL at 10:26

## 2025-04-29 NOTE — H&P ADULT - NSHPSOCIALHISTORY_GEN_ALL_CORE
, with children  - quit smoking 2004, h/o 1 to 1 1/2 PPD over 40 years  - drinks beers on the weekend  - Worked construction and as

## 2025-04-29 NOTE — H&P ADULT - NSICDXFAMILYHX_GEN_ALL_CORE_FT
FAMILY HISTORY:  Mother  Still living? Unknown  FH: gastric cancer, Age at diagnosis: Age Unknown    Sibling  Still living? Unknown  FH: breast cancer, Age at diagnosis: Age Unknown  FH: melanoma, Age at diagnosis: Age Unknown

## 2025-04-29 NOTE — PATIENT PROFILE ADULT - NSTRANSFEREYEGLASSESPAIRS_GEN_A_NUR
Subjective   Patient ID: Deisy Whitaker is a 66 y.o. female who presents for Shoulder Pain (Both shoulders.) and ozempic (Requesting ozempic.).    HPI comes in for bilateral shoulder osteoarthritis    Review of Systems  Constitutional: NO F, chills, or sweats  Eyes: no blurred vision or visual disturbance  ENT: no hearing loss, no congestion, no nasal discharge, no hoarseness and no sore throat.   Cardiovascular: no chest pain, no edema, no palps and no syncope.   Respiratory: no cough,no s.o.b. and no wheezing  Gastrointestinal: no abdominal pain, No C/D no N/V, no blood in stools  Genitourinary: no dysuria, no change in urinary frequency, no urinary hesitancy and no feelings of urinary urgency.   Musculoskeletal: Bilateral shoulder pain  Objective   /80 (BP Location: Left arm, Patient Position: Sitting, BP Cuff Size: Adult)   Pulse 85   Wt 89 kg (196 lb 3.2 oz)   SpO2 95%   BMI 29.83 kg/m²     Physical Exam  Extremity-positive active and passive decreased range of motion of shoulders some pain during passive range of motion  Assessment/Plan     1.  Bilateral shoulder bursitis.  Bilateral injections today Depo-Medrol 80 mg mixed with dexamethasone 4 mg mixed with 1% lidocaine.     
1 pair

## 2025-04-29 NOTE — PATIENT PROFILE ADULT - NSPROMEDSADMININFO_GEN_A_NUR
prefers to take with apple sauce, crush big pills/crush pills for administration prefers to take with apple sauce, crush big pills/administer in food

## 2025-04-29 NOTE — CONSULT NOTE ADULT - SUBJECTIVE AND OBJECTIVE BOX
HPI:  74 y/o male PMHX HTN, HLD, CVA on asprin and Plavix, tongue cancer in remission, sent to ED by colorectal Dr. Dewitt for bloody stool for past 3 days. Patient states he had a colonoscopy last year which was unremarkable aside for diverticulosis. Denies abdominal pain, nausea, vomiting, or prior bleeding.     Patient states last bloody bm was earlier in the morning. Tolerating full liquids.  No cp, no sob, no fevers, no sweats, no chills.  (29 Apr 2025 08:00)      PAST MEDICAL & SURGICAL HISTORY:  HTN (hypertension)      HLD (hyperlipidemia)      CVA (cerebrovascular accident)      Tongue cancer      H/O wrist surgery      Collar bone fracture      H/O total knee replacement, bilateral          Home Medications:  amLODIPine 10 mg oral tablet: 1 tab(s) orally once a day (28 Apr 2025 21:09)  aspirin 81 mg oral delayed release tablet: 1 tab(s) orally once a day (28 Apr 2025 21:09)  atorvastatin 40 mg oral tablet: 1 tab(s) orally once a day (28 Apr 2025 21:09)  clopidogrel 75 mg oral tablet: 1 tab(s) orally once a day (28 Apr 2025 21:09)  levothyroxine 75 mcg (0.075 mg) oral tablet: 1 tab(s) orally once a day (28 Apr 2025 21:09)  pantoprazole 40 mg oral delayed release tablet: 1 tab(s) orally once a day (28 Apr 2025 21:09)      MEDICATIONS  (STANDING):  amLODIPine   Tablet 10 milliGRAM(s) Oral daily  aspirin enteric coated 81 milliGRAM(s) Oral daily  atorvastatin 40 milliGRAM(s) Oral at bedtime  clopidogrel Tablet 75 milliGRAM(s) Oral daily  influenza  Vaccine (HIGH DOSE) 0.5 milliLiter(s) IntraMuscular once  levothyroxine 75 MICROGram(s) Oral daily  pantoprazole    Tablet 40 milliGRAM(s) Oral before breakfast    MEDICATIONS  (PRN):  acetaminophen     Tablet .. 650 milliGRAM(s) Oral every 6 hours PRN Temp greater or equal to 38C (100.4F), Mild Pain (1 - 3)  aluminum hydroxide/magnesium hydroxide/simethicone Suspension 30 milliLiter(s) Oral every 4 hours PRN Dyspepsia  melatonin 3 milliGRAM(s) Oral at bedtime PRN Insomnia  ondansetron Injectable 4 milliGRAM(s) IV Push every 8 hours PRN Nausea and/or Vomiting      Allergies    No Known Allergies    Intolerances        SOCIAL HISTORY:    FAMILY HISTORY:  FH: gastric cancer (Mother)    FH: melanoma (Sibling)    FH: breast cancer (Sibling)        ROS  As above  Otherwise unremarkable    Vital Signs Last 24 Hrs  T(C): 36.6 (29 Apr 2025 07:47), Max: 36.6 (29 Apr 2025 07:47)  T(F): 97.8 (29 Apr 2025 07:47), Max: 97.8 (29 Apr 2025 07:47)  HR: 75 (29 Apr 2025 09:41) (75 - 86)  BP: 119/54 (29 Apr 2025 09:41) (110/52 - 147/67)  BP(mean): 81 (28 Apr 2025 23:35) (66 - 81)  RR: 18 (29 Apr 2025 09:41) (17 - 18)  SpO2: 95% (29 Apr 2025 09:41) (95% - 99%)    Parameters below as of 29 Apr 2025 09:41  Patient On (Oxygen Delivery Method): room air        Constitutional: NAD, well-developed  Respiratory: CTAB  Cardiovascular: S1 and S2, RRR  Gastrointestinal: BS+, soft, NT/ND  Extremities: No peripheral edema  Psychiatric: Normal mood, normal affect  Skin: No rashes    LABS:                        8.7    x     )-----------( x        ( 29 Apr 2025 14:33 )             26.3     04-29    140  |  107  |  22  ----------------------------<  87  3.8   |  31  |  1.20    Ca    9.2      29 Apr 2025 08:39    TPro  6.9  /  Alb  3.5  /  TBili  0.7  /  DBili  x   /  AST  18  /  ALT  19  /  AlkPhos  94  04-28    PT/INR - ( 28 Apr 2025 17:36 )   PT: 11.2 sec;   INR: 0.97 ratio         PTT - ( 28 Apr 2025 17:36 )  PTT:31.7 sec  LIVER FUNCTIONS - ( 28 Apr 2025 17:36 )  Alb: 3.5 g/dL / Pro: 6.9 gm/dL / ALK PHOS: 94 U/L / ALT: 19 U/L / AST: 18 U/L / GGT: x             RADIOLOGY & ADDITIONAL STUDIES:    ACC: [id number] EXAM:  CT ABDOMEN AND PELVIS WAW IC   ORDERED BY: AXEL BUCKLEY     PROCEDURE DATE:  04/28/2025          INTERPRETATION:  CLINICAL INFORMATION: GI bleed.    ADDITIONAL CLINICAL INFORMATION: Other, Non-specified    COMPARISON: None.    CONTRAST/COMPLICATIONS:  IV Contrast: Omnipaque 350  90 cc administered   0 cc discarded  Oral Contrast: NONE  .    PROCEDURE:  CT of the Abdomen and Pelvis was performed.  Precontrast, Arterial and Delayed phases were performed.  Sagittal and coronal reformats were performed.    FINDINGS:  LOWER CHEST: Coronary artery and thoracic aortic calcifications.    LIVER: Within normal limits.  BILE DUCTS: Normal caliber.  GALLBLADDER: Within normal limits.  SPLEEN: Within normal limits.  PANCREAS: Within normal limits.  ADRENALS: Within normal limits.  KIDNEYS/URETERS: Bilateral renal cysts. No hydronephrosis. Symmetric   renal enhancement.    BLADDER: Within normal limits.  REPRODUCTIVE ORGANS: Prostate is enlarged.    BOWEL: Diffuse colonic diverticulosis. Intraluminal arterial blushing and   venous pooling at the distal descending colon (4/80, 5/80) representing   acute GI bleed likely from a diverticula. No bowel obstruction. Normal   appendix  PERITONEUM/RETROPERITONEUM: Within normal limits.  VESSELS: Atherosclerotic changes.  LYMPH NODES: No lymphadenopathy.  ABDOMINAL WALL: Status post right inguinal hernia repair.  BONES: Degenerative changes.    IMPRESSION:  Acute diverticular bleed at the distal descending colon.    Findings werediscussed with Dr. Hall 4/28/2025 7:09 PM by Dr. Kurtz with   read back confirmation.    --- End of Report ---            JENNIFER KURTZ MD; Attending Radiologist  This document has been electronically signed. Apr 28 2025  7:10PM

## 2025-04-29 NOTE — H&P ADULT - HISTORY OF PRESENT ILLNESS
74 y/o male PMHX HTN, HLD, CVA on asprin and Plavix, tongue cancer in remission, sent to ED by colorectal Dr. Dewitt for bloody stool for past 3 days. Pt went to see his colorectal surgeon Dr. Dewitt who did rectal and found occult blood and sent to ED for admission. Pt denies abdominal pain. No history of abdominal surgeries or gastrointestinal bleeding. Pt's last colonoscopy was 5 years ago and was negative, per patient. 74 y/o male PMHX HTN, HLD, CVA on asprin and Plavix, tongue cancer in remission, sent to ED by colorectal Dr. Dewitt for bloody stool for past 3 days. Pt went to see his colorectal surgeon Dr. Dewitt who did rectal and found occult blood and sent to ED for admission. Pt denies abdominal pain. No history of abdominal surgeries or gastrointestinal bleeding. Pt's last colonoscopy was 5 years ago and was negative, per patient.    Patient seen with wife at bedside.  Patient states last bloody bm was earlier in the morning. No cp, no sob, no fevers, no sweats, no chills.

## 2025-04-29 NOTE — CONSULT NOTE ADULT - ASSESSMENT
1. Hematochezia 2/2 diverticular bleeding. No bleeding since admission. Diverticular bleeds generally start and stop on own without intervention.     Recommendation  1. Monitor for overt bleeding and transfuse for hgb < 8  2. If bleeding persists, would need to hold Plavix  3. If ada bleeding with hemodynamic instability, obtain CTA and possible IR consultation for embolization.   4. Check iron studies  5. IF remains stable and advance diet tomorrow to high fiber diet.

## 2025-04-29 NOTE — H&P ADULT - NSICDXPASTSURGICALHX_GEN_ALL_CORE_FT
PAST SURGICAL HISTORY:  Collar bone fracture     H/O total knee replacement, bilateral     H/O wrist surgery

## 2025-04-29 NOTE — PATIENT PROFILE ADULT - FALL HARM RISK - HARM RISK INTERVENTIONS

## 2025-04-29 NOTE — H&P ADULT - ASSESSMENT
76 y/o male PMHX HTN, HLD, CVA on asprin and Plavix, tongue cancer in remission, sent to ED by colorectal Dr. Dewitt for bloody stool for past 3 days. Pt went to see his colorectal surgeon Dr. Dewitt who did rectal and found occult blood and sent to ED for admission.    # GIB due to diverticular bleed  - Admit to medicine  - Full Liquid diet  - Monitor h/h  - GI Consult - Dr. Salinas  - Will obtain repeat CTa and consult IR if bleeding persists  - Continue Protonix 40 mg po daily    #Acute blood loss Anemia - stable  - Monitor h/h 11/0/32.3  ->  9.1/26.9   ->  9.0/26.9  - Consider blood transfusion if Hemoglobin drops below 8    #HTN  - Amlodipine 10 mg po daily    #h/o CVA in 7/2024  - Continue ASA and Plavix and monitor hemoglobin    #Hypothyroidism  - Synthroid 75 mcg po daily    #HLD  - Atorvastatin 40 mg po qhs    #DVT Prophylaxis  - No AC at this time due to Diverticular bleed and patient on ASA and Plavix    #Total Time Spent: 65 minutes Never smoker

## 2025-04-30 LAB
FERRITIN SERPL-MCNC: 144 NG/ML — SIGNIFICANT CHANGE UP (ref 30–400)
HCT VFR BLD CALC: 24.9 % — LOW (ref 39–50)
HGB BLD-MCNC: 8.3 G/DL — LOW (ref 13–17)
IRON SATN MFR SERPL: 15 % — LOW (ref 16–55)
IRON SATN MFR SERPL: 38 UG/DL — LOW (ref 45–165)
MCHC RBC-ENTMCNC: 30.5 PG — SIGNIFICANT CHANGE UP (ref 27–34)
MCHC RBC-ENTMCNC: 33.3 G/DL — SIGNIFICANT CHANGE UP (ref 32–36)
MCV RBC AUTO: 91.5 FL — SIGNIFICANT CHANGE UP (ref 80–100)
NRBC # BLD AUTO: 0 K/UL — SIGNIFICANT CHANGE UP (ref 0–0)
NRBC # FLD: 0 K/UL — SIGNIFICANT CHANGE UP (ref 0–0)
NRBC BLD AUTO-RTO: 0 /100 WBCS — SIGNIFICANT CHANGE UP (ref 0–0)
PLATELET # BLD AUTO: 162 K/UL — SIGNIFICANT CHANGE UP (ref 150–400)
PMV BLD: 10.8 FL — SIGNIFICANT CHANGE UP (ref 7–13)
RBC # BLD: 2.72 M/UL — LOW (ref 4.2–5.8)
RBC # FLD: 12.9 % — SIGNIFICANT CHANGE UP (ref 10.3–14.5)
TIBC SERPL-MCNC: 252 UG/DL — SIGNIFICANT CHANGE UP (ref 220–430)
UIBC SERPL-MCNC: 214 UG/DL — SIGNIFICANT CHANGE UP (ref 110–370)
WBC # BLD: 6.2 K/UL — SIGNIFICANT CHANGE UP (ref 3.8–10.5)
WBC # FLD AUTO: 6.2 K/UL — SIGNIFICANT CHANGE UP (ref 3.8–10.5)

## 2025-04-30 PROCEDURE — 99233 SBSQ HOSP IP/OBS HIGH 50: CPT

## 2025-04-30 RX ORDER — IRON SUCROSE 20 MG/ML
200 INJECTION, SOLUTION INTRAVENOUS ONCE
Refills: 0 | Status: COMPLETED | OUTPATIENT
Start: 2025-04-30 | End: 2025-04-30

## 2025-04-30 RX ADMIN — Medication 75 MICROGRAM(S): at 04:59

## 2025-04-30 RX ADMIN — ATORVASTATIN CALCIUM 40 MILLIGRAM(S): 80 TABLET, FILM COATED ORAL at 20:39

## 2025-04-30 RX ADMIN — AMLODIPINE BESYLATE 10 MILLIGRAM(S): 10 TABLET ORAL at 10:11

## 2025-04-30 RX ADMIN — Medication 40 MILLIGRAM(S): at 04:59

## 2025-04-30 RX ADMIN — Medication 81 MILLIGRAM(S): at 10:11

## 2025-04-30 RX ADMIN — IRON SUCROSE 110 MILLIGRAM(S): 20 INJECTION, SOLUTION INTRAVENOUS at 17:07

## 2025-04-30 RX ADMIN — CLOPIDOGREL BISULFATE 75 MILLIGRAM(S): 75 TABLET, FILM COATED ORAL at 10:11

## 2025-04-30 NOTE — DIETITIAN INITIAL EVALUATION ADULT - PERTINENT LABORATORY DATA
04-29    140  |  107  |  22  ----------------------------<  87  3.8   |  31  |  1.20    Ca    9.2      29 Apr 2025 08:39    TPro  6.9  /  Alb  3.5  /  TBili  0.7  /  DBili  x   /  AST  18  /  ALT  19  /  AlkPhos  94  04-28

## 2025-04-30 NOTE — PROGRESS NOTE ADULT - ASSESSMENT
1. Hematochezia 2/2 diverticular bleeding. No bleeding since admission. Diverticular bleeds generally start and stop on own without intervention.     Recommendation  1. Monitor for overt bleeding and transfuse for hgb < 8  2. If bleeding persists, would need to hold Plavix  3. If ada bleeding with hemodynamic instability, obtain CTA and possible IR consultation for embolization.   4. Recommend iron infusion   5. IF remains stable and tolerating high fiber diet anticipate discharge within 24 hours.   6. Follow up with CRS Dr. Dewitt on discharge  
76 y/o male PMHX HTN, HLD, CVA on asprin and Plavix, tongue cancer in remission, sent to ED by colorectal Dr. Dewitt for bloody stool for past 3 days. Pt went to see his colorectal surgeon Dr. Dewitt who did rectal and found occult blood and sent to ED for admission.    # GIB due to diverticular bleed  - Admit to medicine  - Full Liquid diet  - Monitor h/h  - GI Consult - Dr. Salinas  - Will obtain repeat CTa and consult IR if bleeding persists  - Continue Protonix 40 mg po daily  - advance diet to low fiber today likely dc tomorrow if stable and bleeding resolves     #Acute blood loss Anemia - stable  - Monitor h/h 11/0/32.3  ->  9.1/26.9   ->  9.0/26.9  - Consider blood transfusion if Hemoglobin drops below 8    #HTN  - Amlodipine 10 mg po daily    #h/o CVA in 7/2024  - Continue ASA and Plavix and monitor hemoglobin    #Hypothyroidism  - Synthroid 75 mcg po daily    #HLD  - Atorvastatin 40 mg po qhs    #DVT Prophylaxis  - No AC at this time due to Diverticular bleed and patient on ASA and Plavix

## 2025-04-30 NOTE — PROGRESS NOTE ADULT - SUBJECTIVE AND OBJECTIVE BOX
Patient is a 75y old  Male who presents with a chief complaint of GIB (30 Apr 2025 11:28)      Subective: Seen and examined at bedside. No overnight events. Tolerating diet. No overt bleeding.       PAST MEDICAL & SURGICAL HISTORY:  HTN (hypertension)      HLD (hyperlipidemia)      CVA (cerebrovascular accident)      Tongue cancer      H/O wrist surgery      Collar bone fracture      H/O total knee replacement, bilateral          MEDICATIONS  (STANDING):  amLODIPine   Tablet 10 milliGRAM(s) Oral daily  aspirin enteric coated 81 milliGRAM(s) Oral daily  atorvastatin 40 milliGRAM(s) Oral at bedtime  clopidogrel Tablet 75 milliGRAM(s) Oral daily  influenza  Vaccine (HIGH DOSE) 0.5 milliLiter(s) IntraMuscular once  levothyroxine 75 MICROGram(s) Oral daily  pantoprazole    Tablet 40 milliGRAM(s) Oral before breakfast    MEDICATIONS  (PRN):  acetaminophen     Tablet .. 650 milliGRAM(s) Oral every 6 hours PRN Temp greater or equal to 38C (100.4F), Mild Pain (1 - 3)  aluminum hydroxide/magnesium hydroxide/simethicone Suspension 30 milliLiter(s) Oral every 4 hours PRN Dyspepsia  melatonin 3 milliGRAM(s) Oral at bedtime PRN Insomnia  ondansetron Injectable 4 milliGRAM(s) IV Push every 8 hours PRN Nausea and/or Vomiting      REVIEW OF SYSTEMS:    RESPIRATORY: No shortness of breath  CARDIOVASCULAR: No chest pain  All other review of systems is negative unless indicated above.    Vital Signs Last 24 Hrs  T(C): 36.6 (30 Apr 2025 08:54), Max: 36.7 (29 Apr 2025 15:22)  T(F): 97.8 (30 Apr 2025 08:54), Max: 98 (29 Apr 2025 15:22)  HR: 76 (30 Apr 2025 08:54) (74 - 81)  BP: 144/66 (30 Apr 2025 08:54) (120/57 - 146/74)  BP(mean): --  RR: 16 (30 Apr 2025 08:54) (16 - 18)  SpO2: 96% (30 Apr 2025 08:54) (93% - 96%)    Parameters below as of 30 Apr 2025 08:54  Patient On (Oxygen Delivery Method): room air        PHYSICAL EXAM:    Constitutional: NAD, well-developed  Respiratory: CTAB  Cardiovascular: S1 and S2, RRR  Gastrointestinal: BS+, soft, NT/ND  Extremities: No peripheral edema  Psychiatric: Normal mood, normal affect    LABS:                        8.3    6.20  )-----------( 162      ( 30 Apr 2025 07:06 )             24.9     04-29    140  |  107  |  22  ----------------------------<  87  3.8   |  31  |  1.20    Ca    9.2      29 Apr 2025 08:39    TPro  6.9  /  Alb  3.5  /  TBili  0.7  /  DBili  x   /  AST  18  /  ALT  19  /  AlkPhos  94  04-28    PT/INR - ( 28 Apr 2025 17:36 )   PT: 11.2 sec;   INR: 0.97 ratio         PTT - ( 28 Apr 2025 17:36 )  PTT:31.7 sec  LIVER FUNCTIONS - ( 28 Apr 2025 17:36 )  Alb: 3.5 g/dL / Pro: 6.9 gm/dL / ALK PHOS: 94 U/L / ALT: 19 U/L / AST: 18 U/L / GGT: x             RADIOLOGY & ADDITIONAL STUDIES:
HOSPITALIST ATTENDING PROGRESS NOTE    Chart and meds reviewed.  Patient seen and examined.    CC: brbpr    Subjective: naeo advance diet to low fiber today     All other systems reviewed and found to be negative with the exception of what has been described above.    MEDICATIONS  (STANDING):  amLODIPine   Tablet 10 milliGRAM(s) Oral daily  aspirin enteric coated 81 milliGRAM(s) Oral daily  atorvastatin 40 milliGRAM(s) Oral at bedtime  clopidogrel Tablet 75 milliGRAM(s) Oral daily  influenza  Vaccine (HIGH DOSE) 0.5 milliLiter(s) IntraMuscular once  iron sucrose IVPB 200 milliGRAM(s) IV Intermittent once  levothyroxine 75 MICROGram(s) Oral daily  pantoprazole    Tablet 40 milliGRAM(s) Oral before breakfast    MEDICATIONS  (PRN):  acetaminophen     Tablet .. 650 milliGRAM(s) Oral every 6 hours PRN Temp greater or equal to 38C (100.4F), Mild Pain (1 - 3)  aluminum hydroxide/magnesium hydroxide/simethicone Suspension 30 milliLiter(s) Oral every 4 hours PRN Dyspepsia  melatonin 3 milliGRAM(s) Oral at bedtime PRN Insomnia  ondansetron Injectable 4 milliGRAM(s) IV Push every 8 hours PRN Nausea and/or Vomiting      VITALS:  T(F): 97.5 (04-30-25 @ 15:09), Max: 97.8 (04-29-25 @ 20:37)  HR: 71 (04-30-25 @ 15:09) (71 - 81)  BP: 125/57 (04-30-25 @ 15:09) (125/57 - 146/74)  RR: 16 (04-30-25 @ 15:09) (16 - 18)  SpO2: 93% (04-30-25 @ 15:09) (93% - 96%)  Wt(kg): --    I&O's Summary    30 Apr 2025 07:01  -  30 Apr 2025 16:50  --------------------------------------------------------  IN: 360 mL / OUT: 0 mL / NET: 360 mL        CAPILLARY BLOOD GLUCOSE          PHYSICAL EXAM:  Gen: NAD  HEENT:  pupils equal and reactive, EOMI, no oropharyngeal lesions, erythema, exudates, oral thrush  NECK:   supple, no carotid bruits, no palpable lymph nodes, no thyromegaly  CV:  +S1, +S2, regular, no murmurs or rubs  RESP:   lungs clear to auscultation bilaterally, no wheezing, rales, rhonchi, good air entry bilaterally  BREAST:  not examined  GI:  abdomen soft, non-tender, non-distended, normal BS, no bruits, no abdominal masses, no palpable masses  RECTAL:  not examined  :  not examined  MSK:   normal muscle tone, no atrophy, no rigidity, no contractions  EXT:  no clubbing, no cyanosis, no edema, no calf pain, swelling or erythema  VASCULAR:  pulses equal and symmetric in the upper and lower extremities  NEURO:  AAOX3, no focal neurological deficits, follows all commands, able to move extremities spontaneously  SKIN:  no ulcers, lesions or rashes    LABS:                            8.3    6.20  )-----------( 162      ( 30 Apr 2025 07:06 )             24.9     04-29    140  |  107  |  22  ----------------------------<  87  3.8   |  31  |  1.20    Ca    9.2      29 Apr 2025 08:39    TPro  6.9  /  Alb  3.5  /  TBili  0.7  /  DBili  x   /  AST  18  /  ALT  19  /  AlkPhos  94  04-28        LIVER FUNCTIONS - ( 28 Apr 2025 17:36 )  Alb: 3.5 g/dL / Pro: 6.9 gm/dL / ALK PHOS: 94 U/L / ALT: 19 U/L / AST: 18 U/L / GGT: x           PT/INR - ( 28 Apr 2025 17:36 )   PT: 11.2 sec;   INR: 0.97 ratio         PTT - ( 28 Apr 2025 17:36 )  PTT:31.7 sec  Urinalysis Basic - ( 29 Apr 2025 08:39 )    Color: x / Appearance: x / SG: x / pH: x  Gluc: 87 mg/dL / Ketone: x  / Bili: x / Urobili: x   Blood: x / Protein: x / Nitrite: x   Leuk Esterase: x / RBC: x / WBC x   Sq Epi: x / Non Sq Epi: x / Bacteria: x              CULTURES:      Additional results/Imaging, I have personally reviewed:    Telemetry, personally reviewed:

## 2025-04-30 NOTE — DIETITIAN INITIAL EVALUATION ADULT - PERTINENT MEDS FT
MEDICATIONS  (STANDING):  amLODIPine   Tablet 10 milliGRAM(s) Oral daily  aspirin enteric coated 81 milliGRAM(s) Oral daily  atorvastatin 40 milliGRAM(s) Oral at bedtime  clopidogrel Tablet 75 milliGRAM(s) Oral daily  influenza  Vaccine (HIGH DOSE) 0.5 milliLiter(s) IntraMuscular once  levothyroxine 75 MICROGram(s) Oral daily  pantoprazole    Tablet 40 milliGRAM(s) Oral before breakfast    MEDICATIONS  (PRN):  acetaminophen     Tablet .. 650 milliGRAM(s) Oral every 6 hours PRN Temp greater or equal to 38C (100.4F), Mild Pain (1 - 3)  aluminum hydroxide/magnesium hydroxide/simethicone Suspension 30 milliLiter(s) Oral every 4 hours PRN Dyspepsia  melatonin 3 milliGRAM(s) Oral at bedtime PRN Insomnia  ondansetron Injectable 4 milliGRAM(s) IV Push every 8 hours PRN Nausea and/or Vomiting

## 2025-04-30 NOTE — DIETITIAN INITIAL EVALUATION ADULT - ADD RECOMMEND
1) C/w low fiber diet. ADAT as per GI, rec regular diet once medically feasible.  2) Add Premier protein shake BID to optimize nutritional needs (provides 160 kcal, 30 g protein/ shake).  3) Encourage protein-rich foods, maximize food preferences.  4) Encourage gradual reintroduction of fiber, maintain adequate hydration.   5) Monitor bowel movements, if no BM for >3 days, consider implementing bowel regimen.   6) MVI w/ minerals daily to ensure 100% RDA met.  7) Consider adding thiamine 100 mg daily 2/2 poor PO intake/ malnutrition.  8) Provide assistance w/ meals; Tray set-up, open all containers.  9) Monitor lytes/ min and replete prn. Consider obtaining vitamin D 25OH level to assess nutriture. consider checking B6, B12, thiamine, folate, carnitine, and copper levels as malnutrition can cause these to be deficient.  10) Obtain weekly wt to track/trend changes.   11) Confirm goals of care regarding nutrition support.  RD will continue to monitor PO intake, labs, hydration, and wt prn.

## 2025-04-30 NOTE — DIETITIAN INITIAL EVALUATION ADULT - ETIOLOGY
r/t decreased ability to meet increased nutrient needs 2/2 GIB d/t diverticular bleed, tongue ca, CVA

## 2025-04-30 NOTE — DIETITIAN INITIAL EVALUATION ADULT - ORAL INTAKE PTA/DIET HISTORY
Pt's wife at bedside who assisted in diet/wt hx. Pt lives at home w/ wife. Pt reports good appetite PTA, consuming 3 meals/day. Typical intake includes cereal and eggs for breakfast, light lunch, and meat, potatoes, and vegetable for dinner. Reports consuming ensure shakes during ca however has since stopped. Likely meeting < 75% ENN x >/=1 mos. Reports episode of blood in stool Friday night 4/25.

## 2025-04-30 NOTE — DIETITIAN INITIAL EVALUATION ADULT - OTHER INFO
74 y/o M w/ PMHX HTN, HLD, CVA on asprin and Plavix, tongue cancer in remission, sent to ED by colorectal Dr. Dewitt for bloody stool for past 3 days. Pt went to see his colorectal surgeon Dr. Dewitt who did rectal and found occult blood and sent to ED for admission. Pt denies abdominal pain. Pt's last colonoscopy was 5 years ago and was negative, per patient. Patient states last bloody bm was earlier in the morning 4/29. CT A&P 4/28: Acute diverticular bleed at the distal descending colon. Per GI 4/29: "Monitor for overt bleeding and transfuse for hgb < 8. If bleeding persists, would need to hold Plavix. If ada bleeding with hemodynamic instability, obtain CTA and possible IR consultation for embolization. Check iron studies. IF remains stable and advance diet tomorrow to high fiber diet." Admit dx: GIB due to diverticular bleed, Acute blood loss Anemia - stable.     Upon RD visit, pt's wife at bedside who assisted in diet/wt hx. Pt tolerating FLD, reports consuming pudding and coffee this morning. States he has not had a bowel movement since admission (x2 days), is only passing gas. Bed scale wt obtained by RD 4/30: 154#. Per pt, recent UBW of 155#-160#. Reports wt of 179# x 1 year ago prior to CVA. Unintentional wt loss of 25#/14% x 1 year (not clinically significant). Appears thin - NFPE reveals moderate-severe muscle/ fat wasting; meets criteria for PCM. Per GI 4/29 - "IF remains stable and advance diet tomorrow to high fiber diet." Pt now on low fiber diet, rec to ADAT as per MD.  Once medically feasible, rec regular diet w/ Premier protein shake BID to optimize nutritional needs (provides 160 kcal, 30 g protein/ shake). RD educated pt and pt's wife on the advancement from FLD to low fiber diet w/ gradual reintroduction of fiber upon d/c. Discussed nutrition interventions regarding diverticulosis vs diverticulitis flare up. RD provided verbal and written nutrition education on high fiber diet to manage diverticulosis. Discussed increased fluid intake w/ high fiber diet as well. See additional recs below.

## 2025-04-30 NOTE — PROGRESS NOTE ADULT - NUTRITIONAL ASSESSMENT
This patient has been assessed with a concern for Malnutrition and has been determined to have a diagnosis/diagnoses of Severe protein-calorie malnutrition.    This patient is being managed with:   Diet Low Fiber-  Entered: Apr 30 2025 10:05AM

## 2025-04-30 NOTE — DIETITIAN INITIAL EVALUATION ADULT - ENTER FROM (CAL/KG)
Refill Decision Note   Anayeli Hustonedwin  is requesting a refill authorization.  Brief Assessment and Rationale for Refill:  Quick Discontinue     Medication Therapy Plan:  Dose increased to 20 mg BID 4/23/23      Comments:     Note composed:4:49 PM 10/16/2023              30

## 2025-05-01 ENCOUNTER — TRANSCRIPTION ENCOUNTER (OUTPATIENT)
Age: 75
End: 2025-05-01

## 2025-05-01 VITALS
SYSTOLIC BLOOD PRESSURE: 121 MMHG | RESPIRATION RATE: 17 BRPM | HEART RATE: 66 BPM | OXYGEN SATURATION: 93 % | DIASTOLIC BLOOD PRESSURE: 46 MMHG | TEMPERATURE: 97 F

## 2025-05-01 LAB
ANION GAP SERPL CALC-SCNC: 5 MMOL/L — SIGNIFICANT CHANGE UP (ref 5–17)
BUN SERPL-MCNC: 19 MG/DL — SIGNIFICANT CHANGE UP (ref 7–23)
CALCIUM SERPL-MCNC: 9 MG/DL — SIGNIFICANT CHANGE UP (ref 8.5–10.1)
CHLORIDE SERPL-SCNC: 105 MMOL/L — SIGNIFICANT CHANGE UP (ref 96–108)
CO2 SERPL-SCNC: 30 MMOL/L — SIGNIFICANT CHANGE UP (ref 22–31)
CREAT SERPL-MCNC: 1.14 MG/DL — SIGNIFICANT CHANGE UP (ref 0.5–1.3)
EGFR: 66 ML/MIN/1.73M2 — SIGNIFICANT CHANGE UP
EGFR: 66 ML/MIN/1.73M2 — SIGNIFICANT CHANGE UP
GLUCOSE SERPL-MCNC: 97 MG/DL — SIGNIFICANT CHANGE UP (ref 70–99)
HCT VFR BLD CALC: 26.9 % — LOW (ref 39–50)
HGB BLD-MCNC: 8.9 G/DL — LOW (ref 13–17)
MCHC RBC-ENTMCNC: 30.3 PG — SIGNIFICANT CHANGE UP (ref 27–34)
MCHC RBC-ENTMCNC: 33.1 G/DL — SIGNIFICANT CHANGE UP (ref 32–36)
MCV RBC AUTO: 91.5 FL — SIGNIFICANT CHANGE UP (ref 80–100)
NRBC # BLD AUTO: 0 K/UL — SIGNIFICANT CHANGE UP (ref 0–0)
NRBC # FLD: 0 K/UL — SIGNIFICANT CHANGE UP (ref 0–0)
NRBC BLD AUTO-RTO: 0 /100 WBCS — SIGNIFICANT CHANGE UP (ref 0–0)
PLATELET # BLD AUTO: 171 K/UL — SIGNIFICANT CHANGE UP (ref 150–400)
PMV BLD: 10.7 FL — SIGNIFICANT CHANGE UP (ref 7–13)
POTASSIUM SERPL-MCNC: 3.9 MMOL/L — SIGNIFICANT CHANGE UP (ref 3.5–5.3)
POTASSIUM SERPL-SCNC: 3.9 MMOL/L — SIGNIFICANT CHANGE UP (ref 3.5–5.3)
RBC # BLD: 2.94 M/UL — LOW (ref 4.2–5.8)
RBC # FLD: 13.1 % — SIGNIFICANT CHANGE UP (ref 10.3–14.5)
SODIUM SERPL-SCNC: 140 MMOL/L — SIGNIFICANT CHANGE UP (ref 135–145)
WBC # BLD: 6.08 K/UL — SIGNIFICANT CHANGE UP (ref 3.8–10.5)
WBC # FLD AUTO: 6.08 K/UL — SIGNIFICANT CHANGE UP (ref 3.8–10.5)

## 2025-05-01 PROCEDURE — 99239 HOSP IP/OBS DSCHRG MGMT >30: CPT

## 2025-05-01 RX ADMIN — Medication 81 MILLIGRAM(S): at 09:09

## 2025-05-01 RX ADMIN — Medication 40 MILLIGRAM(S): at 05:54

## 2025-05-01 RX ADMIN — CLOPIDOGREL BISULFATE 75 MILLIGRAM(S): 75 TABLET, FILM COATED ORAL at 09:09

## 2025-05-01 RX ADMIN — Medication 75 MICROGRAM(S): at 05:54

## 2025-05-01 NOTE — DISCHARGE NOTE PROVIDER - NSDCMRMEDTOKEN_GEN_ALL_CORE_FT
amLODIPine 10 mg oral tablet: 1 tab(s) orally once a day  aspirin 81 mg oral delayed release tablet: 1 tab(s) orally once a day  atorvastatin 40 mg oral tablet: 1 tab(s) orally once a day  clopidogrel 75 mg oral tablet: 1 tab(s) orally once a day  levothyroxine 75 mcg (0.075 mg) oral tablet: 1 tab(s) orally once a day  pantoprazole 40 mg oral delayed release tablet: 1 tab(s) orally once a day

## 2025-05-01 NOTE — DISCHARGE NOTE NURSING/CASE MANAGEMENT/SOCIAL WORK - NSPROEXTENSIONSOFSELF_GEN_A_NUR
Per verbal order from Dr. Xenia Phillips, draw up 4ml of 1% lidocaine and 1ml of Kenalog 40 for cortisone injection to bilateral knees Cristin Hutchinson MA    Patient provided education handout for cortisone injection.    Patient left office without obtaining post inject
dentures/eyeglasses/hearing aid

## 2025-05-01 NOTE — DISCHARGE NOTE PROVIDER - CARE PROVIDER_API CALL
Jose Talavera  Internal Medicine  [address] [place] [address], [address]  [place] [address]  Phone: [phone]  Fax: [phone]  Follow Up Time:

## 2025-05-01 NOTE — DISCHARGE NOTE NURSING/CASE MANAGEMENT/SOCIAL WORK - PATIENT PORTAL LINK FT
You can access the FollowMyHealth Patient Portal offered by [facility] by registering at the following website: [email or web address]. By joining [facility] [facility]’s FollowMyHealth portal, you will also be able to view your health information using other applications (apps) compatible with our system.

## 2025-05-01 NOTE — DISCHARGE NOTE PROVIDER - ATTENDING DISCHARGE PHYSICAL EXAMINATION:
PHYSICAL EXAM:    GENERAL: Comfortable, no acute distress   HEAD:  Normocephalic, atraumatic  EYES: EOMI, PERRLA  HEENT: Moist mucous membranes  NECK: Supple, No JVD  NERVOUS SYSTEM:  Alert & Oriented X3, Motor Strength 5/5 B/L upper and lower extremities  CHEST/LUNG: Clear to auscultation bilaterally  HEART: Regular rate and rhythm  ABDOMEN: Soft, non tender, Nondistended, Bowel sounds present  GENITOURINARY: Voiding, no palpable bladder  EXTREMITIES:   No clubbing, cyanosis, or edema  MUSCULOSKELETAL- No muscle tenderness, no joint tenderness  SKIN-no rash           Statement Selected

## 2025-05-01 NOTE — DISCHARGE NOTE PROVIDER - HOSPITAL COURSE
#Discharge: do not delete  76 y/o male PMHX HTN, HLD, CVA on asprin and Plavix, tongue cancer in remission, sent to ED by colorectal Dr. Dewitt for bloody stool for past 3 days. Pt went to see his colorectal surgeon Dr. Dewitt who did rectal and found occult blood and sent to ED for admission found to have a diverticular bleed that self resolved - did not require prbc. Stable for discharge with a hg that became lateral at 8.9    Problem List/Main Diagnoses (system-based):     # GIB due to diverticular bleed  - h/h leveled off, bleeding stopped likely all diverticular in nature  - was seen by GI dr donahue while here   - Continue Protonix 40 mg po daily  - advanced diet, tolerated     #Acute blood loss Anemia - stable  - Monitor h/h 11/0/32.3  ->  9.1/26.9   ->  9.0/26.9 -> 8.9 now lateralized off     #HTN  - Amlodipine 10 mg po daily    #h/o CVA in 7/2024  - Continue ASA and Plavix and monitor hemoglobin    #Hypothyroidism  - Synthroid 75 mcg po daily    #HLD  - Atorvastatin 40 mg po qhs    Inpatient treatment course: as above  New medications: none

## 2025-05-01 NOTE — DISCHARGE NOTE NURSING/CASE MANAGEMENT/SOCIAL WORK - FINANCIAL ASSISTANCE
[facility] provides services at a reduced cost to those who are determined to be eligible through [facility]’s financial assistance program. Information regarding [facility]’s financial assistance program can be found by going to [email or web address] or by calling [phone].

## 2025-05-01 NOTE — DISCHARGE NOTE PROVIDER - NSDCFUSCHEDAPPT_GEN_ALL_CORE_FT
[facility] Physician [facility]  VASCULAR [address]  Scheduled Appointment: 06/18/2025    Betsy Hallman  [facility] Physician [facility]  VASCULAR [address]  Scheduled Appointment: 06/18/2025

## 2025-05-01 NOTE — DISCHARGE NOTE PROVIDER - NSDCCPCAREPLAN_GEN_ALL_CORE_FT
PRINCIPAL DISCHARGE DIAGNOSIS  Diagnosis: GI bleed  Assessment and Plan of Treatment: Diverticular bleeding occurs when pouches (diverticula) that have developed in the wall of the large intestine (colon) bleed. If you have these pouches, you have a condition called diverticulosis. Diverticular bleeding causes a large amount of blood to appear in your stool.The reason pouches (diverticula) form in the colon wall is not completely understood. Doctors think diverticula form when high pressure inside the colon pushes against weak spots in the colon wall.  Normally, a diet with enough fibre (also called roughage) produces stool that is bulky and can move easily through the colon. If a diet is low in fibre, the colon must exert more pressure than usual to move small, hard stool. A low-fibre diet also can increase the time stool remains in the bowel, adding to the high pressure.  Eating a high-fibre diet, getting plenty of fluid, and exercising regularly may help prevent the formation of diverticula.

## 2025-05-06 DIAGNOSIS — Z86.73 PERSONAL HISTORY OF TRANSIENT ISCHEMIC ATTACK (TIA), AND CEREBRAL INFARCTION WITHOUT RESIDUAL DEFICITS: ICD-10-CM

## 2025-05-06 DIAGNOSIS — Z96.653 PRESENCE OF ARTIFICIAL KNEE JOINT, BILATERAL: ICD-10-CM

## 2025-05-06 DIAGNOSIS — Z79.82 LONG TERM (CURRENT) USE OF ASPIRIN: ICD-10-CM

## 2025-05-06 DIAGNOSIS — Z85.810 PERSONAL HISTORY OF MALIGNANT NEOPLASM OF TONGUE: ICD-10-CM

## 2025-05-06 DIAGNOSIS — I10 ESSENTIAL (PRIMARY) HYPERTENSION: ICD-10-CM

## 2025-05-06 DIAGNOSIS — K57.31 DIVERTICULOSIS OF LARGE INTESTINE WITHOUT PERFORATION OR ABSCESS WITH BLEEDING: ICD-10-CM

## 2025-05-06 DIAGNOSIS — E03.9 HYPOTHYROIDISM, UNSPECIFIED: ICD-10-CM

## 2025-05-06 DIAGNOSIS — D62 ACUTE POSTHEMORRHAGIC ANEMIA: ICD-10-CM

## 2025-06-18 ENCOUNTER — APPOINTMENT (OUTPATIENT)
Dept: VASCULAR SURGERY | Facility: CLINIC | Age: 75
End: 2025-06-18
Payer: MEDICARE

## 2025-06-18 PROBLEM — E78.5 HYPERLIPIDEMIA, UNSPECIFIED: Chronic | Status: ACTIVE | Noted: 2025-04-28

## 2025-06-18 PROBLEM — I63.9 CEREBRAL INFARCTION, UNSPECIFIED: Chronic | Status: ACTIVE | Noted: 2025-04-28

## 2025-06-18 PROBLEM — C02.9 MALIGNANT NEOPLASM OF TONGUE, UNSPECIFIED: Chronic | Status: ACTIVE | Noted: 2025-04-28

## 2025-06-18 PROBLEM — I10 ESSENTIAL (PRIMARY) HYPERTENSION: Chronic | Status: ACTIVE | Noted: 2025-04-28

## 2025-06-18 PROCEDURE — 93880 EXTRACRANIAL BILAT STUDY: CPT

## 2025-06-18 PROCEDURE — 99214 OFFICE O/P EST MOD 30 MIN: CPT

## 2025-07-01 ENCOUNTER — NON-APPOINTMENT (OUTPATIENT)
Age: 75
End: 2025-07-01

## 2025-07-03 ENCOUNTER — APPOINTMENT (OUTPATIENT)
Dept: GASTROENTEROLOGY | Facility: CLINIC | Age: 75
End: 2025-07-03
Payer: MEDICARE

## 2025-07-03 VITALS
SYSTOLIC BLOOD PRESSURE: 172 MMHG | WEIGHT: 156 LBS | DIASTOLIC BLOOD PRESSURE: 76 MMHG | BODY MASS INDEX: 22.33 KG/M2 | HEIGHT: 70 IN

## 2025-07-03 PROBLEM — Z09 FOLLOW-UP EXAM: Status: ACTIVE | Noted: 2025-07-03

## 2025-07-03 PROBLEM — Z86.73 HISTORY OF CEREBROVASCULAR ACCIDENT: Status: RESOLVED | Noted: 2024-08-19 | Resolved: 2025-07-03

## 2025-07-03 PROBLEM — Z85.810 HISTORY OF TONGUE CANCER: Status: RESOLVED | Noted: 2025-07-03 | Resolved: 2025-07-03

## 2025-07-03 PROCEDURE — G2211 COMPLEX E/M VISIT ADD ON: CPT

## 2025-07-03 PROCEDURE — 99215 OFFICE O/P EST HI 40 MIN: CPT

## 2025-07-03 RX ORDER — UBIDECARENONE 100 MG
100 CAPSULE ORAL
Refills: 0 | Status: ACTIVE | COMMUNITY

## 2025-07-03 RX ORDER — PANTOPRAZOLE SODIUM 40 MG/1
40 GRANULE, DELAYED RELEASE ORAL
Refills: 0 | Status: ACTIVE | COMMUNITY

## 2025-07-03 RX ORDER — ATORVASTATIN CALCIUM 40 MG/1
40 TABLET, FILM COATED ORAL
Refills: 0 | Status: ACTIVE | COMMUNITY

## 2025-07-03 RX ORDER — CLOPIDOGREL BISULFATE 75 MG/1
75 TABLET, FILM COATED ORAL
Refills: 0 | Status: ACTIVE | COMMUNITY

## 2025-07-07 PROBLEM — Q39.6 DIVERTICULUM OF ESOPHAGUS: Status: ACTIVE | Noted: 2025-07-07

## 2025-07-18 ENCOUNTER — OUTPATIENT (OUTPATIENT)
Dept: OUTPATIENT SERVICES | Facility: HOSPITAL | Age: 75
LOS: 1 days | End: 2025-07-18
Payer: MEDICARE

## 2025-07-18 DIAGNOSIS — Z98.890 OTHER SPECIFIED POSTPROCEDURAL STATES: Chronic | ICD-10-CM

## 2025-07-18 DIAGNOSIS — R13.19 OTHER DYSPHAGIA: ICD-10-CM

## 2025-07-18 DIAGNOSIS — Z96.653 PRESENCE OF ARTIFICIAL KNEE JOINT, BILATERAL: Chronic | ICD-10-CM

## 2025-07-18 DIAGNOSIS — S42.009A FRACTURE OF UNSPECIFIED PART OF UNSPECIFIED CLAVICLE, INITIAL ENCOUNTER FOR CLOSED FRACTURE: Chronic | ICD-10-CM

## 2025-07-18 PROCEDURE — 74220 X-RAY XM ESOPHAGUS 1CNTRST: CPT

## 2025-07-18 PROCEDURE — 74220 X-RAY XM ESOPHAGUS 1CNTRST: CPT | Mod: 26

## 2025-07-19 DIAGNOSIS — R13.19 OTHER DYSPHAGIA: ICD-10-CM

## 2025-07-22 ENCOUNTER — APPOINTMENT (OUTPATIENT)
Dept: GASTROENTEROLOGY | Facility: CLINIC | Age: 75
End: 2025-07-22
Payer: MEDICARE

## 2025-07-22 DIAGNOSIS — K22.5 DIVERTICULUM OF ESOPHAGUS, ACQUIRED: ICD-10-CM

## 2025-07-22 PROCEDURE — 99214 OFFICE O/P EST MOD 30 MIN: CPT | Mod: 2W

## 2025-08-07 ENCOUNTER — OUTPATIENT (OUTPATIENT)
Dept: OUTPATIENT SERVICES | Facility: HOSPITAL | Age: 75
LOS: 1 days | End: 2025-08-07

## 2025-08-07 VITALS
TEMPERATURE: 97 F | OXYGEN SATURATION: 96 % | WEIGHT: 149.91 LBS | HEIGHT: 68 IN | RESPIRATION RATE: 18 BRPM | SYSTOLIC BLOOD PRESSURE: 168 MMHG | HEART RATE: 71 BPM | DIASTOLIC BLOOD PRESSURE: 71 MMHG

## 2025-08-07 DIAGNOSIS — S42.009A FRACTURE OF UNSPECIFIED PART OF UNSPECIFIED CLAVICLE, INITIAL ENCOUNTER FOR CLOSED FRACTURE: Chronic | ICD-10-CM

## 2025-08-07 DIAGNOSIS — Z96.653 PRESENCE OF ARTIFICIAL KNEE JOINT, BILATERAL: Chronic | ICD-10-CM

## 2025-08-07 DIAGNOSIS — Q39.6 CONGENITAL DIVERTICULUM OF ESOPHAGUS: ICD-10-CM

## 2025-08-07 DIAGNOSIS — Z98.890 OTHER SPECIFIED POSTPROCEDURAL STATES: Chronic | ICD-10-CM

## 2025-08-07 DIAGNOSIS — Z91.89 OTHER SPECIFIED PERSONAL RISK FACTORS, NOT ELSEWHERE CLASSIFIED: ICD-10-CM

## 2025-08-07 DIAGNOSIS — I63.9 CEREBRAL INFARCTION, UNSPECIFIED: ICD-10-CM

## 2025-08-07 RX ORDER — UBIDECARENONE 100 MG
1 CAPSULE ORAL
Refills: 0 | DISCHARGE

## 2025-08-21 ENCOUNTER — RESULT REVIEW (OUTPATIENT)
Age: 75
End: 2025-08-21

## 2025-08-21 ENCOUNTER — TRANSCRIPTION ENCOUNTER (OUTPATIENT)
Age: 75
End: 2025-08-21

## 2025-08-21 ENCOUNTER — APPOINTMENT (OUTPATIENT)
Dept: GASTROENTEROLOGY | Facility: HOSPITAL | Age: 75
End: 2025-08-21

## 2025-08-21 ENCOUNTER — OUTPATIENT (OUTPATIENT)
Dept: OUTPATIENT SERVICES | Facility: HOSPITAL | Age: 75
LOS: 1 days | End: 2025-08-21
Payer: MEDICARE

## 2025-08-21 VITALS
OXYGEN SATURATION: 98 % | HEART RATE: 68 BPM | SYSTOLIC BLOOD PRESSURE: 142 MMHG | DIASTOLIC BLOOD PRESSURE: 87 MMHG | RESPIRATION RATE: 14 BRPM

## 2025-08-21 VITALS — WEIGHT: 153 LBS | HEIGHT: 70 IN

## 2025-08-21 DIAGNOSIS — Q39.6 CONGENITAL DIVERTICULUM OF ESOPHAGUS: ICD-10-CM

## 2025-08-21 DIAGNOSIS — Z96.653 PRESENCE OF ARTIFICIAL KNEE JOINT, BILATERAL: Chronic | ICD-10-CM

## 2025-08-21 DIAGNOSIS — S42.009A FRACTURE OF UNSPECIFIED PART OF UNSPECIFIED CLAVICLE, INITIAL ENCOUNTER FOR CLOSED FRACTURE: Chronic | ICD-10-CM

## 2025-08-21 DIAGNOSIS — Z98.890 OTHER SPECIFIED POSTPROCEDURAL STATES: Chronic | ICD-10-CM

## 2025-08-21 DIAGNOSIS — B37.81 CANDIDAL ESOPHAGITIS: ICD-10-CM

## 2025-08-21 PROCEDURE — 43239 EGD BIOPSY SINGLE/MULTIPLE: CPT | Mod: XU

## 2025-08-21 PROCEDURE — 43249 ESOPH EGD DILATION <30 MM: CPT

## 2025-08-21 PROCEDURE — 88312 SPECIAL STAINS GROUP 1: CPT | Mod: 26

## 2025-08-21 PROCEDURE — 88305 TISSUE EXAM BY PATHOLOGIST: CPT | Mod: 26

## 2025-08-21 DEVICE — CATH ESOPH/PYLO/COL DIL15-18MM: Type: IMPLANTABLE DEVICE | Status: FUNCTIONAL

## 2025-08-21 DEVICE — CATH ESOPH/PYLO/COL DIL12-15MM: Type: IMPLANTABLE DEVICE | Status: FUNCTIONAL

## 2025-08-21 RX ORDER — FLUCONAZOLE 200 MG/1
200 TABLET ORAL DAILY
Qty: 21 | Refills: 0 | Status: ACTIVE | COMMUNITY
Start: 2025-08-21 | End: 1900-01-01

## 2025-08-25 LAB — SURGICAL PATHOLOGY STUDY: SIGNIFICANT CHANGE UP

## (undated) DEVICE — GOWN LG

## (undated) DEVICE — DRSG 2X2

## (undated) DEVICE — BIOPSY FORCEP COLD DISP

## (undated) DEVICE — SALIVA EJECTOR (BLUE)

## (undated) DEVICE — ATTACHMENT DISTAL 4X11.35MM

## (undated) DEVICE — DVC INFLATION CRE STERIFLATE

## (undated) DEVICE — CLAMP BX HOT RAD JAW 3

## (undated) DEVICE — PACK IV START WITH CHG

## (undated) DEVICE — CATH IV SAFE BC 22G X 1" (BLUE)

## (undated) DEVICE — BITE BLOCK ADULT 20 X 27MM (GREEN)

## (undated) DEVICE — TUBING MEDI-VAC W MAXIGRIP CONNECTORS 1/4"X6'

## (undated) DEVICE — BIOPSY FORCEP RADIAL JAW 4 STANDARD WITH NEEDLE

## (undated) DEVICE — DRSG BANDAID 0.75X3"

## (undated) DEVICE — CONTAINER FORMALIN 80ML YELLOW

## (undated) DEVICE — ELCTR ECG CONDUCTIVE ADHESIVE

## (undated) DEVICE — DENTURE CUP PINK

## (undated) DEVICE — PROBE HYBRIDKNIFE T TYPE OD 2.3MMX1.9M

## (undated) DEVICE — UNDERPAD LINEN SAVER 17 X 24"

## (undated) DEVICE — TUBING IV SET GRAVITY 3Y 100" MACRO

## (undated) DEVICE — LUBRICATING JELLY HR ONE SHOT 3G

## (undated) DEVICE — BASIN EMESIS 10IN GRADUATED MAUVE

## (undated) DEVICE — DRSG CURITY GAUZE SPONGE 4 X 4" 12-PLY NON-STERILE

## (undated) DEVICE — CARTRIDGE PUMP PLUS

## (undated) DEVICE — ORGANIZER MIO MEDICAL DEVICE DISP